# Patient Record
Sex: MALE | Race: WHITE | URBAN - METROPOLITAN AREA
[De-identification: names, ages, dates, MRNs, and addresses within clinical notes are randomized per-mention and may not be internally consistent; named-entity substitution may affect disease eponyms.]

---

## 2021-04-13 DIAGNOSIS — Z23 ENCOUNTER FOR IMMUNIZATION: ICD-10-CM

## 2023-12-07 ENCOUNTER — TELEPHONE (OUTPATIENT)
Dept: NEUROLOGY | Facility: CLINIC | Age: 78
End: 2023-12-07

## 2023-12-07 NOTE — TELEPHONE ENCOUNTER
12/7 at 10:10 am:    This is Mine Lopez, and I just missed a call from Soy Baldwin. He left me a voice mail to call about a triage. So you can call me back. Pt was calling from phone #[phone].

## 2024-01-04 ENCOUNTER — TELEPHONE (OUTPATIENT)
Dept: NEUROLOGY | Facility: CLINIC | Age: 79
End: 2024-01-04

## 2024-01-08 ENCOUNTER — OFFICE VISIT (OUTPATIENT)
Dept: NEUROLOGY | Facility: CLINIC | Age: 79
End: 2024-01-08
Payer: MEDICARE

## 2024-01-08 VITALS
HEART RATE: 99 BPM | HEIGHT: 69 IN | OXYGEN SATURATION: 100 % | RESPIRATION RATE: 18 BRPM | SYSTOLIC BLOOD PRESSURE: 130 MMHG | BODY MASS INDEX: 35.7 KG/M2 | DIASTOLIC BLOOD PRESSURE: 79 MMHG | WEIGHT: 241 LBS | TEMPERATURE: 97.2 F

## 2024-01-08 DIAGNOSIS — R79.9 ABNORMAL FINDING OF BLOOD CHEMISTRY, UNSPECIFIED: ICD-10-CM

## 2024-01-08 DIAGNOSIS — R27.9 UNSPECIFIED LACK OF COORDINATION: ICD-10-CM

## 2024-01-08 DIAGNOSIS — G62.9 NEUROPATHY: Primary | ICD-10-CM

## 2024-01-08 PROCEDURE — 99204 OFFICE O/P NEW MOD 45 MIN: CPT | Performed by: PSYCHIATRY & NEUROLOGY

## 2024-01-08 RX ORDER — GABAPENTIN 600 MG/1
TABLET ORAL
COMMUNITY
Start: 2023-12-18

## 2024-01-08 RX ORDER — IBUPROFEN 400 MG/1
400 TABLET ORAL
COMMUNITY

## 2024-01-08 RX ORDER — ZOLPIDEM TARTRATE 5 MG/1
5 TABLET ORAL
COMMUNITY

## 2024-01-08 RX ORDER — SIMVASTATIN 20 MG
20 TABLET ORAL DAILY
COMMUNITY

## 2024-01-08 RX ORDER — PANTOPRAZOLE SODIUM 20 MG/1
TABLET, DELAYED RELEASE ORAL
COMMUNITY
Start: 2023-10-26

## 2024-01-08 RX ORDER — TAMSULOSIN HYDROCHLORIDE 0.4 MG/1
CAPSULE ORAL
COMMUNITY
Start: 2023-12-04

## 2024-01-08 RX ORDER — LOSARTAN POTASSIUM AND HYDROCHLOROTHIAZIDE 12.5; 5 MG/1; MG/1
1 TABLET ORAL DAILY
COMMUNITY

## 2024-01-08 RX ORDER — DULOXETIN HYDROCHLORIDE 60 MG/1
CAPSULE, DELAYED RELEASE ORAL
COMMUNITY
Start: 2023-12-18

## 2024-01-08 NOTE — ASSESSMENT & PLAN NOTE
Mr. Reid has gradually progressive stocking distribution numbness.  He has prominent vibratory loss but the remainder of his examination is relatively benign from a neurologic standpoint.  The significance of his possible sarcoidosis is not certain to me at this time, and I told him he should follow-up with his regular doctor.  It is hard for me to believe that it has reactivated after 50 years but I will defer to his regular doctor.  I suspect he has neuropathy; I doubt that his neck and back have because his entire clinical picture.  His neck and back have both been surgically decompressed.  I do not think that further imaging is required at this time.  I am going to obtain an EMG of the legs.  The most interesting variable from my standpoint is his severe truncal pain with rapid weight loss that occurred in 2018.  Shingles was a consideration which is certainly possible, but I am not sure that I can explain his weight loss easily.  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  I am going to obtain further laboratory testing.  It is possible that he has multiple diagnoses.  Alarming gastrointestinal pathology was apparently excluded during his hospitalization and does not need to be repeated at this time.  Further measures will be considered based on his test results and clinical course.

## 2024-01-08 NOTE — PROGRESS NOTES
"Please note: this note was created with voice recognition software. Occasional wrong word or \"sound alike\" substitutions may occur due to the inherent limitations of voice recognition software. Read the chart carefully and recognize (using context) where substitutions may have occurred. I am available to discuss any questions.       1. Neuropathy  Assessment & Plan:  Mr. Reid has gradually progressive stocking distribution numbness.  He has prominent vibratory loss but the remainder of his examination is relatively benign from a neurologic standpoint.  The significance of his possible sarcoidosis is not certain to me at this time, and I told him he should follow-up with his regular doctor.  It is hard for me to believe that it has reactivated after 50 years but I will defer to his regular doctor.  I suspect he has neuropathy; I doubt that his neck and back have because his entire clinical picture.  His neck and back have both been surgically decompressed.  I do not think that further imaging is required at this time.  I am going to obtain an EMG of the legs.  The most interesting variable from my standpoint is his severe truncal pain with rapid weight loss that occurred in 2018.  Shingles was a consideration which is certainly possible, but I am not sure that I can explain his weight loss easily.  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  I am going to obtain further laboratory testing.  It is possible that he has multiple diagnoses.  Alarming gastrointestinal pathology was apparently excluded during his hospitalization and does not need to be repeated at this time.  Further measures will be considered based on his test results and clinical course.    Orders:  -     HEMOGLOBIN A1C W/ EAG ESTIMATION; Future  -     Protein, Total and Protein Electrophoresis with Immunofixation; Future  -     EMG 2 limb lower " extremity; Future  -     Vitamin B12; Future  -     Methylmalonic acid, serum; Future    2. Abnormal finding of blood chemistry, unspecified  -     HEMOGLOBIN A1C W/ EAG ESTIMATION; Future    3. Unspecified lack of coordination  -     Vitamin B12; Future        Problem List Items Addressed This Visit       Neuropathy - Primary     Mr. Reid has gradually progressive stocking distribution numbness.  He has prominent vibratory loss but the remainder of his examination is relatively benign from a neurologic standpoint.  The significance of his possible sarcoidosis is not certain to me at this time, and I told him he should follow-up with his regular doctor.  It is hard for me to believe that it has reactivated after 50 years but I will defer to his regular doctor.  I suspect he has neuropathy; I doubt that his neck and back have because his entire clinical picture.  His neck and back have both been surgically decompressed.  I do not think that further imaging is required at this time.  I am going to obtain an EMG of the legs.  The most interesting variable from my standpoint is his severe truncal pain with rapid weight loss that occurred in 2018.  Shingles was a consideration which is certainly possible, but I am not sure that I can explain his weight loss easily.  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  I am going to obtain further laboratory testing.  It is possible that he has multiple diagnoses.  Alarming gastrointestinal pathology was apparently excluded during his hospitalization and does not need to be repeated at this time.  Further measures will be considered based on his test results and clinical course.         Relevant Orders    HEMOGLOBIN A1C W/ EAG ESTIMATION    Protein, Total and Protein Electrophoresis with Immunofixation    EMG 2 limb lower extremity    Vitamin B12    Methylmalonic acid, serum      Other Visit Diagnoses       Abnormal finding of blood chemistry, unspecified        Relevant Orders    HEMOGLOBIN A1C W/ EAG ESTIMATION    Unspecified lack of coordination        Relevant Orders    Vitamin B12            Problem List       Neuropathy    Current Assessment & Plan     Mr. Reid has gradually progressive stocking distribution numbness.  He has prominent vibratory loss but the remainder of his examination is relatively benign from a neurologic standpoint.  The significance of his possible sarcoidosis is not certain to me at this time, and I told him he should follow-up with his regular doctor.  It is hard for me to believe that it has reactivated after 50 years but I will defer to his regular doctor.  I suspect he has neuropathy; I doubt that his neck and back have because his entire clinical picture.  His neck and back have both been surgically decompressed.  I do not think that further imaging is required at this time.  I am going to obtain an EMG of the legs.  The most interesting variable from my standpoint is his severe truncal pain with rapid weight loss that occurred in 2018.  Shingles was a consideration which is certainly possible, but I am not sure that I can explain his weight loss easily.  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  I am going to obtain further laboratory testing.  It is possible that he has multiple diagnoses.  Alarming gastrointestinal pathology was apparently excluded during his hospitalization and does not need to be repeated at this time.  Further measures will be considered based on his test results and clinical course.          Other Visit Diagnoses       Abnormal finding of blood chemistry, unspecified        Unspecified lack of coordination                  I had the pleasure of seeing Kevin Reid, a 78 y.o. male, for neuromuscular consultation. The  "referral was for neuropathy.     In 2018 he had a right knee replacement.  1 or 2 months after he developed severe \"unbearable\" pain \"in my rib cage traveling around to the back\" on the left.  He could not sleep and lost 20 pounds in approximately 3 weeks.  He was admitted to the hospital for chest pain.  He had \"a patch on my back and it was pale around to the front\" on his skin so the diagnosis of shingles was considered, and he was treated with antiviral medications.  Laboratory testing for Lyme disease was \"marginally positive\" and he was treated with antibiotics.  An infectious disease specialist eventually told him he had \"Lyme disease at some point\".  After a few months the pain resolved.  In retrospect, even before the surgery he had been experiencing \"numbness\" in the right foot but with further questioning it was \"crampy, did not feel right\" without sensory loss.  However, after the pain resolved he noticed that he could not feel his feet bilaterally to touch, mostly in the toes and the heels.  It has gradually felt \"like I have socks on even though I do not\" and it is uncomfortable for him to wear pants.  This has gradually climbed to the knees, sometimes higher.  Sometimes he perceives diminished sensation in his fingertips.     He had a series of MRI studies of the entire spine.  He apparently had moderate to severe lumbar stenosis, and he had decompressive surgery in 2018.  He had a cervical laminectomy in 2019 as well, but these have not led to a change in his neurologic symptoms.    He was found to have enlarged lymph nodes in his mediastinum leading to mediastinoscopy and the diagnosis of sarcoidosis in 1973.  He was treated with prednisone, and this has not returned.  He has \"borderline fasting sugars\" but his hemoglobin A1c has ranged from 5.1-5.6.      Past Medical History:   Diagnosis Date    Hypertension        Past Surgical History:   Procedure Laterality Date    REPLACEMENT TOTAL KNEE Right  "    REPLACEMENT TOTAL KNEE Left     TOTAL HIP ARTHROPLASTY Left        Patient has no known allergies.    Social History     Tobacco Use   Smoking Status Never   Smokeless Tobacco Never       Social History     Substance and Sexual Activity   Alcohol Use None       Social History     Substance and Sexual Activity   Drug Use Not on file       History reviewed. No pertinent family history.      Current Outpatient Medications:     DULoxetine (CYMBALTA) 60 mg delayed release capsule, , Disp: , Rfl:     gabapentin (NEURONTIN) 600 MG tablet, , Disp: , Rfl:     ibuprofen (MOTRIN) 400 mg tablet, Take 400 mg by mouth, Disp: , Rfl:     losartan-hydrochlorothiazide (HYZAAR) 50-12.5 mg per tablet, Take 1 tablet by mouth daily, Disp: , Rfl:     pantoprazole (PROTONIX) 20 mg tablet, , Disp: , Rfl:     simvastatin (ZOCOR) 20 mg tablet, Take 20 mg by mouth daily, Disp: , Rfl:     tamsulosin (FLOMAX) 0.4 mg, , Disp: , Rfl:     zolpidem (AMBIEN) 5 mg tablet, Take 5 mg by mouth, Disp: , Rfl:     No visits with results within 6 Month(s) from this visit.   Latest known visit with results is:   No results found for any previous visit.        No results found for this or any previous visit.     No results found for this or any previous visit.    No results found for this or any previous visit.    No results found for this or any previous visit.    No results found for this or any previous visit.    No results found for this or any previous visit.    No results found for this or any previous visit.    No results found for this or any previous visit.    No results found for this or any previous visit.    No results found for this or any previous visit.    No results found for this or any previous visit.    No results found for this or any previous visit.      Review of Systems   Constitutional:  Negative for chills and fever.   HENT:  Negative for ear pain and sore throat.    Eyes:  Negative for pain and visual disturbance.   Respiratory:   "Negative for cough and shortness of breath.    Cardiovascular:  Negative for chest pain and palpitations.   Gastrointestinal:  Negative for abdominal pain and vomiting.   Genitourinary:  Negative for dysuria and hematuria.   Musculoskeletal:  Negative for arthralgias and back pain.   Skin:  Negative for color change and rash.   Neurological:  Positive for numbness (both feet and legs and finger tips). Negative for seizures and syncope.   All other systems reviewed and are negative.        On examination,     Blood pressure 130/79, pulse 99, temperature (!) 97.2 °F (36.2 °C), temperature source Temporal, resp. rate 18, height 5' 9\" (1.753 m), weight 109 kg (241 lb), SpO2 100%.    Well developed, well nourished, in no acute distress    Normocephalic, atraumatic    Heart: regular rate and rhythm    No scarring seen over trunk, anteriorly or posteriorly    Extremities: no clubbing, cyanosis, or edema    Speech and cognition appeared normal    Cranial nerves:  II: Pupils equal, round, and reactive to light. No gross visual field defect. I did not appreciate optic disc edema.  III, IV, VI: Extraocular movements intact  V: Normal facial sensation in all three divisions of the trigeminal nerve bilaterally  VII: Normal facial strength  VIII: Hearing intact to finger rub bilaterally  IX, X: Palate elevated symmetrically  XI: Sternocleidomastoid strength normal bilaterally  XII: Tongue protruded in midline without atrophy or fibrillations    Motor:  Normal tone and bulk throughout.   Muscle strength testing by the [facility] scale was 5/5 in the deltoid, biceps, triceps, wrist extensors, wrist flexors, finger extensors, finger flexors,. Hip flexors, quadriceps, ankle dorsiflexors, ankle plantar flexors, and EHL bilaterally    Deep tendon reflexes:   2+ and symmetrical in the biceps, triceps, brachioradialis, and patellas; 2+ in the left ankle, 1+ on the right  Toes downgoing (no Babinski sign)  No Menendez's sign    Sensation: Normal " pinprick and light touch throughout. No truncal sensory level present.  Markedly diminished vibratory sensation in the legs    Cerebellar: normal finger to nose and heel to shin testing    Gait: Normal heel, toe, and tandem gait            There are no Patient Instructions on file for this visit.      Thank you very much for allowing me to participate in your patient's care. Please feel free to contact me for any questions or concerns. Please be aware of the inherent limitations of voice recognition software, which may result in transcriptional errors.    Quirino Ott MD

## 2024-01-11 ENCOUNTER — APPOINTMENT (OUTPATIENT)
Dept: LAB | Facility: CLINIC | Age: 79
End: 2024-01-11
Payer: MEDICARE

## 2024-01-11 DIAGNOSIS — R79.9 ABNORMAL FINDING OF BLOOD CHEMISTRY, UNSPECIFIED: ICD-10-CM

## 2024-01-11 DIAGNOSIS — G62.9 NEUROPATHY: ICD-10-CM

## 2024-01-11 DIAGNOSIS — R27.9 UNSPECIFIED LACK OF COORDINATION: ICD-10-CM

## 2024-01-11 LAB
EST. AVERAGE GLUCOSE BLD GHB EST-MCNC: 131 MG/DL
HBA1C MFR BLD: 6.2 %
VIT B12 SERPL-MCNC: 640 PG/ML (ref 180–914)

## 2024-01-11 PROCEDURE — 83036 HEMOGLOBIN GLYCOSYLATED A1C: CPT

## 2024-01-11 PROCEDURE — 82607 VITAMIN B-12: CPT

## 2024-01-11 PROCEDURE — 83918 ORGANIC ACIDS TOTAL QUANT: CPT

## 2024-01-11 PROCEDURE — 36415 COLL VENOUS BLD VENIPUNCTURE: CPT

## 2024-01-11 PROCEDURE — 84165 PROTEIN E-PHORESIS SERUM: CPT

## 2024-01-13 LAB
ALBUMIN SERPL ELPH-MCNC: 3.7 G/DL (ref 3.2–5.1)
ALBUMIN SERPL ELPH-MCNC: 58.7 % (ref 48–70)
ALPHA1 GLOB SERPL ELPH-MCNC: 0.28 G/DL (ref 0.15–0.47)
ALPHA1 GLOB SERPL ELPH-MCNC: 4.5 % (ref 1.8–7)
ALPHA2 GLOB SERPL ELPH-MCNC: 0.74 G/DL (ref 0.42–1.04)
ALPHA2 GLOB SERPL ELPH-MCNC: 11.7 % (ref 5.9–14.9)
BETA GLOB ABNORMAL SERPL ELPH-MCNC: 0.4 G/DL (ref 0.31–0.57)
BETA1 GLOB SERPL ELPH-MCNC: 6.4 % (ref 4.7–7.7)
BETA2 GLOB SERPL ELPH-MCNC: 6.9 % (ref 3.1–7.9)
BETA2+GAMMA GLOB SERPL ELPH-MCNC: 0.43 G/DL (ref 0.2–0.58)
GAMMA GLOB ABNORMAL SERPL ELPH-MCNC: 0.74 G/DL (ref 0.4–1.66)
GAMMA GLOB SERPL ELPH-MCNC: 11.8 % (ref 6.9–22.3)
IGG/ALB SER: 1.42 {RATIO} (ref 1.1–1.8)
PROT PATTERN SERPL ELPH-IMP: ABNORMAL
PROT SERPL-MCNC: 6.3 G/DL (ref 6.4–8.2)

## 2024-01-13 PROCEDURE — 84165 PROTEIN E-PHORESIS SERUM: CPT | Performed by: STUDENT IN AN ORGANIZED HEALTH CARE EDUCATION/TRAINING PROGRAM

## 2024-01-18 LAB — METHYLMALONATE SERPL-SCNC: 135 NMOL/L (ref 0–378)

## 2024-01-25 ENCOUNTER — HOSPITAL ENCOUNTER (OUTPATIENT)
Dept: NEUROLOGY | Facility: CLINIC | Age: 79
End: 2024-01-25
Payer: MEDICARE

## 2024-01-25 DIAGNOSIS — G62.9 NEUROPATHY: ICD-10-CM

## 2024-01-25 PROBLEM — M54.17 LUMBOSACRAL RADICULOPATHY: Status: ACTIVE | Noted: 2024-01-25

## 2024-01-25 PROCEDURE — 95886 MUSC TEST DONE W/N TEST COMP: CPT | Performed by: PSYCHIATRY & NEUROLOGY

## 2024-01-25 PROCEDURE — 95911 NRV CNDJ TEST 9-10 STUDIES: CPT | Performed by: PSYCHIATRY & NEUROLOGY

## 2024-02-09 ENCOUNTER — TELEPHONE (OUTPATIENT)
Dept: NEUROLOGY | Facility: CLINIC | Age: 79
End: 2024-02-09

## 2024-02-09 NOTE — TELEPHONE ENCOUNTER
Spoke to pt-advised Dr Ott would liek to have a follow up appt with him-scheduled for 2/19 @ 10:30

## 2024-02-09 NOTE — TELEPHONE ENCOUNTER
I am a little surprised because the study really did not seem all that abnormal. I would like to take another looka t him- schedule for 30 minute follow-up

## 2024-02-09 NOTE — TELEPHONE ENCOUNTER
Patient called he did the EMG. He would like Dr Ott to give him a call to go over the results. Please call patient at [phone].

## 2024-02-15 ENCOUNTER — TELEPHONE (OUTPATIENT)
Dept: NEUROLOGY | Facility: CLINIC | Age: 79
End: 2024-02-15

## 2024-02-19 ENCOUNTER — OFFICE VISIT (OUTPATIENT)
Dept: NEUROLOGY | Facility: CLINIC | Age: 79
End: 2024-02-19
Payer: MEDICARE

## 2024-02-19 VITALS
DIASTOLIC BLOOD PRESSURE: 76 MMHG | WEIGHT: 238.4 LBS | TEMPERATURE: 98.2 F | BODY MASS INDEX: 35.21 KG/M2 | HEART RATE: 70 BPM | SYSTOLIC BLOOD PRESSURE: 128 MMHG | OXYGEN SATURATION: 97 %

## 2024-02-19 DIAGNOSIS — M54.50 LOW BACK PAIN: Primary | ICD-10-CM

## 2024-02-19 DIAGNOSIS — G62.9 NEUROPATHY: ICD-10-CM

## 2024-02-19 PROCEDURE — 99213 OFFICE O/P EST LOW 20 MIN: CPT | Performed by: PSYCHIATRY & NEUROLOGY

## 2024-02-19 NOTE — ASSESSMENT & PLAN NOTE
I suspect that Mr. Reid has neuropathy even though it was not confirmed on recent electrodiagnostic testing.  His electrical study did show features of lumbar radiculopathy but I am not convinced this accounts for his entire clinical picture.  Laboratory testing has been significant only for a glycosylated hemoglobin level of 6.2.  This is particularly interesting given that he had symptoms that might suggest diabetic thoracoabdominal neuropathy even though they were years ago.  This seems to predate any obvious potential glucose intolerance but I do not see an obvious alternative diagnosis at this time.  Regardless, I believe that the most important treatment right now is going to be continued monitoring and treatment of any glucose intolerance.

## 2024-02-19 NOTE — PROGRESS NOTES
"Please note: this note was created with voice recognition software. Occasional wrong word or \"sound alike\" substitutions may occur due to the inherent limitations of voice recognition software. Read the chart carefully and recognize (using context) where substitutions may have occurred. I am available to discuss any questions.       1. Low back pain  -     Ambulatory Referral to Physical Therapy; Future    2. Neuropathy  Assessment & Plan:  I suspect that Mr. Reid has neuropathy even though it was not confirmed on recent electrodiagnostic testing.  His electrical study did show features of lumbar radiculopathy but I am not convinced this accounts for his entire clinical picture.  Laboratory testing has been significant only for a glycosylated hemoglobin level of 6.2.  This is particularly interesting given that he had symptoms that might suggest diabetic thoracoabdominal neuropathy even though they were years ago.  This seems to predate any obvious potential glucose intolerance but I do not see an obvious alternative diagnosis at this time.  Regardless, I believe that the most important treatment right now is going to be continued monitoring and treatment of any glucose intolerance.          Problem List Items Addressed This Visit       Neuropathy     I suspect that Mr. Reid has neuropathy even though it was not confirmed on recent electrodiagnostic testing.  His electrical study did show features of lumbar radiculopathy but I am not convinced this accounts for his entire clinical picture.  Laboratory testing has been significant only for a glycosylated hemoglobin level of 6.2.  This is particularly interesting given that he had symptoms that might suggest diabetic thoracoabdominal neuropathy even though they were years ago.  This seems to predate any obvious potential glucose intolerance but I do not see an obvious alternative diagnosis at this time.  Regardless, I believe that the most important treatment " "right now is going to be continued monitoring and treatment of any glucose intolerance.          Other Visit Diagnoses       Low back pain    -  Primary    Relevant Orders    Ambulatory Referral to Physical Therapy            Problem List       Neuropathy    Current Assessment & Plan     I suspect that Mr. Reid has neuropathy even though it was not confirmed on recent electrodiagnostic testing.  His electrical study did show features of lumbar radiculopathy but I am not convinced this accounts for his entire clinical picture.  Laboratory testing has been significant only for a glycosylated hemoglobin level of 6.2.  This is particularly interesting given that he had symptoms that might suggest diabetic thoracoabdominal neuropathy even though they were years ago.  This seems to predate any obvious potential glucose intolerance but I do not see an obvious alternative diagnosis at this time.  Regardless, I believe that the most important treatment right now is going to be continued monitoring and treatment of any glucose intolerance.         Lumbosacral radiculopathy     Other Visit Diagnoses       Low back pain    -  Primary              I had the pleasure of seeing Kevin Reid, a 78 y.o. male, for neuromuscular follow-up.     In 2018 he had right knee replacement.  Within 2 months he developed severe left \"unbearable\" pain \"in my rib cage traveling around to the back\".  He lost 20 pounds in 3 weeks and could not sleep.   He had \"a patch on my back and it was pale around to the front\" on his skin; the diagnosis of shingles was considered, and he was treated with antiviral medications.  Laboratory testing for Lyme disease was \"marginally positive\" and he was treated with antibiotics.  An ID specialist told him he had \"Lyme disease at some point\".  After a few months the pain resolved.  In retrospect, even before the surgery he had been experiencing \"numbness\" in the right foot but with further questioning it was " "\"crampy, did not feel right\" without sensory loss.  After the pain resolved he noticed that he could not feel his feet bilaterally to touch, mostly in the toes and the heels.  It now feels \"like I have socks on even though I do not\" .  This has gradually climbed to the knees, sometimes higher.  Sometimes he perceives diminished sensation in his fingertips.      He had a series of MRI studies of the entire spine.  He apparently had moderate to severe lumbar stenosis, and he had decompressive surgery in 2018.  He had a cervical laminectomy in 2019 as well, but these have not led to a change in his neurologic symptoms.     He was found to have enlarged lymph nodes in his mediastinum leading to mediastinoscopy and the diagnosis of sarcoidosis in 1973.  He was treated with prednisone, and this has not returned.  He has \"borderline fasting sugars\" but his hemoglobin A1c has ranged from 5.1-5.6, but is now 6.2.  Laboratory testing was otherwise unremarkable.  He had an EMG which was in keeping with a chronic left L5-S1 radiculopathy and right L4-S1 radiculopathy but without underlying evidence of neuropathy.           Past Medical History:   Diagnosis Date    Hypertension        Past Surgical History:   Procedure Laterality Date    REPLACEMENT TOTAL KNEE Right     REPLACEMENT TOTAL KNEE Left     TOTAL HIP ARTHROPLASTY Left        Patient has no known allergies.    Social History     Tobacco Use   Smoking Status Never   Smokeless Tobacco Never       Social History     Substance and Sexual Activity   Alcohol Use None       Social History     Substance and Sexual Activity   Drug Use Not on file       History reviewed. No pertinent family history.      Current Outpatient Medications:     DULoxetine (CYMBALTA) 60 mg delayed release capsule, , Disp: , Rfl:     gabapentin (NEURONTIN) 600 MG tablet, , Disp: , Rfl:     ibuprofen (MOTRIN) 400 mg tablet, Take 400 mg by mouth, Disp: , Rfl:     losartan-hydrochlorothiazide (HYZAAR) " 50-12.5 mg per tablet, Take 1 tablet by mouth daily, Disp: , Rfl:     pantoprazole (PROTONIX) 20 mg tablet, , Disp: , Rfl:     simvastatin (ZOCOR) 20 mg tablet, Take 20 mg by mouth daily, Disp: , Rfl:     tamsulosin (FLOMAX) 0.4 mg, , Disp: , Rfl:     zolpidem (AMBIEN) 5 mg tablet, Take 5 mg by mouth, Disp: , Rfl:     Appointment on 01/11/2024   Component Date Value Ref Range Status    Hemoglobin A1C 01/11/2024 6.2 (H)  Normal 4.0-5.6%; PreDiabetic 5.7-6.4%; Diabetic >=6.5%; Glycemic control for adults with diabetes <7.0% % Final    EAG 01/11/2024 131  mg/dl Final    Vitamin B-12 01/11/2024 640  180 - 914 pg/mL Final    Methylmalonic Acid, S 01/11/2024 135  0 - 378 nmol/L Final    A/G Ratio 01/11/2024 1.42  1.10 - 1.80 Final    Albumin Electrophoresis 01/11/2024 58.7  48.0 - 70.0 % Final    Albumin CONC 01/11/2024 3.70  3.20 - 5.10 g/dl Final    Alpha 1 01/11/2024 4.5  1.8 - 7.0 % Final    ALPHA 1 CONC 01/11/2024 0.28  0.15 - 0.47 g/dL Final    Alpha 2 01/11/2024 11.7  5.9 - 14.9 % Final    ALPHA 2 CONC 01/11/2024 0.74  0.42 - 1.04 g/dL Final    Beta-1 01/11/2024 6.4  4.7 - 7.7 % Final    BETA 1 CONC 01/11/2024 0.40  0.31 - 0.57 g/dL Final    Beta-2 01/11/2024 6.9  3.1 - 7.9 % Final    BETA 2 CONC 01/11/2024 0.43  0.20 - 0.58 g/dL Final    Gamma Globulin 01/11/2024 11.8  6.9 - 22.3 % Final    GAMMA CONC 01/11/2024 0.74  0.40 - 1.66 g/dL Final    Total Protein 01/11/2024 6.3 (L)  6.4 - 8.2 g/dL Final    SPEP Interpretation 01/11/2024 See Comment   Final    No monoclonal bands noted. Reviewed by: Naren Nj MD **Electronic Signature**        No results found for this or any previous visit.     No results found for this or any previous visit.    No results found for this or any previous visit.    No results found for this or any previous visit.    No results found for this or any previous visit.    No results found for this or any previous visit.    No results found for this or any previous visit.    No  results found for this or any previous visit.    No results found for this or any previous visit.    No results found for this or any previous visit.    No results found for this or any previous visit.    No results found for this or any previous visit.      Review of Systems   Constitutional:  Negative for appetite change, fatigue and fever.   HENT: Negative.  Negative for hearing loss, tinnitus, trouble swallowing and voice change.    Eyes: Negative.  Negative for photophobia, pain and visual disturbance.   Respiratory: Negative.  Negative for shortness of breath.    Cardiovascular: Negative.  Negative for palpitations.   Gastrointestinal: Negative.  Negative for nausea and vomiting.   Endocrine: Negative.  Negative for cold intolerance.   Genitourinary: Negative.  Negative for dysuria, frequency and urgency.   Musculoskeletal:  Negative for back pain, gait problem, myalgias, neck pain and neck stiffness.   Skin: Negative.  Negative for rash.   Allergic/Immunologic: Negative.    Neurological:  Positive for numbness (b/l legs symptoms unchanged). Negative for dizziness, tremors, seizures, syncope, facial asymmetry, speech difficulty, weakness, light-headedness and headaches.   Hematological: Negative.  Does not bruise/bleed easily.   Psychiatric/Behavioral:  Positive for sleep disturbance (recently diagnosed w/sleep apnea). Negative for confusion and hallucinations.          On examination,     Blood pressure 128/76, pulse 70, temperature 98.2 °F (36.8 °C), temperature source Temporal, weight 108 kg (238 lb 6.4 oz), SpO2 97%.    Well developed, well nourished, in no acute distress    Normocephalic, atraumatic      Speech and cognition appeared normal    Cranial nerves:  II: Pupils equal, round, and reactive to light. No gross visual field defect. I did not appreciate optic disc edema.  III, IV, VI: Extraocular movements intact  V: Normal facial sensation in all three divisions of the trigeminal nerve  bilaterally  VII: Normal facial strength  VIII: Hearing intact to finger rub bilaterally  IX, X: Palate elevated symmetrically  XI: Sternocleidomastoid strength normal bilaterally  XII: Tongue protruded in midline without atrophy or fibrillations    Motor:  Normal tone and bulk throughout.   Muscle strength testing by the MRC scale was 5/5 in the deltoid, biceps, triceps, wrist extensors, wrist flexors, finger extensors, finger flexors,. Hip flexors, quadriceps, ankle dorsiflexors, ankle plantar flexors, and EHL bilaterally    Deep tendon reflexes:   2+ and symmetrical in the biceps, triceps, brachioradialis, and patellas; 2+ in the left ankle, 1+ on the right  Toes downgoing (no Babinski sign)  No Menendez's sign     Sensation: Normal pinprick and light touch throughout. No truncal sensory level present.  Markedly diminished vibratory sensation in the legs      Cerebellar: normal finger to nose and heel to shin testing    Gait: Normal heel, toe, and tandem gait            There are no Patient Instructions on file for this visit.      Thank you very much for allowing me to participate in your patient's care. Please feel free to contact me for any questions or concerns. Please be aware of the inherent limitations of voice recognition software, which may result in transcriptional errors.    Quirino Ott MD

## 2024-02-29 ENCOUNTER — EVALUATION (OUTPATIENT)
Dept: PHYSICAL THERAPY | Facility: CLINIC | Age: 79
End: 2024-02-29
Payer: MEDICARE

## 2024-02-29 DIAGNOSIS — I10 HYPERTENSION, UNSPECIFIED TYPE: ICD-10-CM

## 2024-02-29 DIAGNOSIS — G62.9 NEUROPATHY: ICD-10-CM

## 2024-02-29 DIAGNOSIS — M54.50 CHRONIC BILATERAL LOW BACK PAIN WITHOUT SCIATICA: Primary | ICD-10-CM

## 2024-02-29 DIAGNOSIS — G89.29 CHRONIC BILATERAL LOW BACK PAIN WITHOUT SCIATICA: Primary | ICD-10-CM

## 2024-02-29 PROCEDURE — 97110 THERAPEUTIC EXERCISES: CPT

## 2024-02-29 PROCEDURE — 97161 PT EVAL LOW COMPLEX 20 MIN: CPT

## 2024-02-29 NOTE — PROGRESS NOTES
PT Evaluation     Today's date: 2024  Patient name: Kevin Reid  : 1945  MRN: [id number]  Referring provider: Quirino Ott MD  Dx:   Encounter Diagnosis     ICD-10-CM    1. Chronic bilateral low back pain without sciatica  M54.50 Ambulatory Referral to Physical Therapy    G89.29       2. Hypertension, unspecified type  I10       3. Neuropathy  G62.9           Start Time: 848  Stop Time: 931  Total time in clinic (min): 43 minutes    Assessment  Assessment details: The pt is a 78 year old male who presents to skilled physical therapy services due to a decline in functional status related to an onset of acute on chronic LBP associated with lifting/carrying [employer] tree 4-6 weeks ago. Upon completion of the IE, the pt presents with impairments in pain, thoracolumbar mobility, flexibility, core/abdominal strength (diastasis recti - bulge), neural sensation (PMH of neuropathy), and LE strength. As a result of these impairments, the pt has difficulty with the following functional activities: standing, lifting, carrying, transfers (STS, bed mobility), ambulating, self-care/hygiene, stair negotiations, as well as participating in ADLs/IALDs and recreational activities (e.g., golf). POC will include manual therapy for flexibility/mobility and symptom modulation, modalities (PRN), TE/TA/NR interventions for functional strength and neuromuscular control, HEP, and pt education. The pt will continue to benefit from skilled physical therapy services to address impairments in order to return to his PLOF without limitations due to pain.  Impairments: abnormal or restricted ROM, activity intolerance, impaired physical strength, lacks appropriate home exercise program, pain with function, poor posture  and poor body mechanics    Symptom irritability: moderateBarriers to therapy: Neuropathy; HTN; Lymes Disease; PMH includes LLE Total Hip Replacement, RLE/LLE total knee replacements --- 2019: Laminectomy C3;  Laminectomy L3 and Fusion L4-L5  Understanding of Dx/Px/POC: good   Prognosis: good    Goals  STGs: Achieve within 5 weeks  1. Increase functional LE strength to at least 4+/5 for each major muscle group in order to reflect improved functional strength for ADLs/IADLs.  2. Decrease pain to no greater than 4/10 on the NPRS in order to reflect improved activity capacity/tolerance.  3. Independent with HEP to aid in continued progress between treatment sessions.   4. Increase FOTO Functional Status measure by at least 5 points in order to reflect improvements in functional status and activity capacity.    LTGs: Achieve within 10 weeks  1. Decrease pain to no greater than 2/10 on the NPRS in order to reflect improved activity capacity/tolerance.  2. Progress to at least 90% return to PLOF per pt report in order to reflect improvements in functional status and activity capacity.  3. Increase FOTO Functional Status measure score to at least benchmark score.  4. Independent with HEP for long-term management of symptoms and functional mobility.      Plan  Patient would benefit from: skilled physical therapy  Referral necessary: Yes  Planned modality interventions: thermotherapy: hydrocollator packs  Planned therapy interventions: abdominal trunk stabilization, joint mobilization, manual therapy, balance/weight bearing training, body mechanics training, neuromuscular re-education, patient education, postural training, flexibility, strengthening, stretching, functional ROM exercises, therapeutic activities, therapeutic exercise and home exercise program  Frequency: 1x week  Duration in weeks: 10  Plan of Care beginning date: 2/29/2024  Plan of Care expiration date: 5/3/2024  Treatment plan discussed with: patient        Subjective Evaluation    History of Present Illness  Date of onset: 1/15/2024  Mechanism of injury: Pt reports symptoms of chronic LBP with a recent increase in pain over the past 4-6 weeks after  "carrying/lifting [employer] tree out of the house. -- Pt reports a history of LBP (R side > L side).      Chronic LE knee pain (L > R) pain, particularly with going up stairs. Pt denies that the knee give out and notes that his knee feel stable.    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 (no medical plans); Multiple steroid injections for low back; Ablation ( - \"that did not do anything at all\")          Recurrent probem    Patient Goals  Patient goals for therapy: decreased pain, increased motion, independence with ADLs/IADLs and return to sport/leisure activities  Patient goal: Golf (currently can tolerate 9 holes)  Pain  Current pain ratin  At best pain ratin  At worst pain ratin  Quality: dull ache  Relieving factors: relaxation, rest and change in position (Cat-cow)  Aggravating factors: standing, walking, stair climbing and lifting (Carrying; Lifting/carrying fire wood; Standing (tolerance: 30 minutes at most); Walking (hurts both knees and back); Getting out of bed in morning; Getting out of a chair; Self-care/hygiene)  Progression: worsening    Social Support  Steps to enter house: yes (Front: 1; Back: 13)  Stairs in house: yes (2 sets: 13 steps)   Lives in: multiple-level home  Lives with: spouse    Employment status: not working ()  Treatments  Previous treatment: injection treatment and medication  Current treatment: injection treatment, medication and physical therapy  Current treatment comments: Injection in left shoulder within past year.         Objective     Concurrent Complaints  Negative for night pain, disturbed sleep, bladder dysfunction and bowel dysfunction    Static Posture     Head  Forward.    Shoulders  Depressed and rounded.    Thoracic Spine  Hyperkyphosis.    Lumbar Spine   Flattened and decreased lordosis.     Postural Observations  Seated posture: fair  Standing posture: fair      Tenderness     Lumbar Spine  No tenderness in the spinous process. " "    Neurological Testing     Sensation     Lumbar   Left   Intact: light touch  Diminished: light touch    Right   Intact: light touch    Comments   Left light touch: Diminished along medial lower leg    Active Range of Motion     Lumbar   Flexion:  with pain Restriction level: moderate  Extension: Active lumbar extension: Pain: \"That's the worst\"  with pain  Left lateral flexion: Active left lumbar lateral flexion: pain left side.    with pain Restriction level: minimal  Right lateral flexion: Active right lumbar lateral flexion: Pain right side.  with pain Restriction level: moderate  Left rotation: Active left lumbar rotation: pain left side.  with pain Restriction level: minimal  Right rotation: Active right lumbar rotation: pain right side.  with pain Restriction level: moderate    Joint Play     Hypomobile: L1, L2, L3, L4, L5 and S1     Strength/Myotome Testing     Left Hip   Planes of Motion   Flexion: 4 (Back pain -- assessed seated)    Right Hip   Planes of Motion   Flexion: 4 (Back pain -- Assessed seated)    Left Knee   Flexion: 4+  Extension: 5    Right Knee   Flexion: 4+  Extension: 5    Left Ankle/Foot   Dorsiflexion: 5    Right Ankle/Foot   Dorsiflexion: 5    Tests     Lumbar     Left   Negative slump test.     Right   Negative slump test.     Left Hip   Negative NIKKI and FADIR.     Right Hip   Negative NIKKI and FADIR.              Precautions: Neuropathy; HTN; Lymes Disease; PMH includes LLE Total Hip Replacement, RLE/LLE total knee replacements --- 2019: Laminectomy C3; Laminectomy L3 and Fusion L4-L5       2/29            Manuals             STM/MFR             Lumbar PA                                       Neuro Re-Ed             Sciatic Nerve Glide - Supine 10x ea LE            TA Activation/Core brace             TA + Iso Hip ADD             TA + Bent Knee Fallout                                                    Ther Ex             Bent Knee Fallout 10x ea LE            PPT 10x          "   Open book             Seated Lumbar Flex w/ PB             Mid Row             Shld Ext/Lat Pulldown             Shld Ext AAROM w/ Cane                                                     Anatomy as it relates to pt's symptoms/presentation KS            Address pt's questions PRN KS            HEP KS                         Ther Activity                                       Gait Training                                       Modalities

## 2024-03-07 ENCOUNTER — OFFICE VISIT (OUTPATIENT)
Dept: PHYSICAL THERAPY | Facility: CLINIC | Age: 79
End: 2024-03-07
Payer: MEDICARE

## 2024-03-07 DIAGNOSIS — G62.9 NEUROPATHY: ICD-10-CM

## 2024-03-07 DIAGNOSIS — M54.50 CHRONIC BILATERAL LOW BACK PAIN WITHOUT SCIATICA: Primary | ICD-10-CM

## 2024-03-07 DIAGNOSIS — G89.29 CHRONIC BILATERAL LOW BACK PAIN WITHOUT SCIATICA: Primary | ICD-10-CM

## 2024-03-07 DIAGNOSIS — I10 HYPERTENSION, UNSPECIFIED TYPE: ICD-10-CM

## 2024-03-07 PROCEDURE — 97110 THERAPEUTIC EXERCISES: CPT

## 2024-03-07 PROCEDURE — 97140 MANUAL THERAPY 1/> REGIONS: CPT

## 2024-03-07 NOTE — PROGRESS NOTES
"Daily Note     Today's date: 3/7/2024  Patient name: Kevin Reid  : 1945  MRN: [id number]  Referring provider: Quirino Ott MD  Dx:   Encounter Diagnosis     ICD-10-CM    1. Chronic bilateral low back pain without sciatica  M54.50     G89.29       2. Hypertension, unspecified type  I10       3. Neuropathy  G62.9           Start Time: 0850  Stop Time: 09  Total time in clinic (min): 41 minutes    Subjective: \"The back's just a little achy.\" Pt notes that he has not been moving or doing much this morning. -- Pt reports that he did the home exercises 1 or 2 times each day since his initial appointment.      Objective: See treatment diary below      Assessment: The pt participated in a skilled physical therapy session that focused on manual intervention, therapeutic exercise, and neuromuscular re-education. The program was progressed as this was the first treatment session after the IE. LE bike was introduced as a warm-up to improve mobility and decrease stiffness. Shoulder extension AAROM with cane was included to address impairments in UE mobility with trunk rotations necessary for self care activities (e.g., bathing, daily hygiene). He presented with mild low back pain at end range as he completed RUE shoulder extension with trunk rotation to the right. He tolerated treatment well. The pt would benefit from continued PT to address impairments in order to improve quality of life and return to his PLOF without limitations due to pain.      Plan: Continue per plan of care.      Precautions: Neuropathy; HTN; Lymes Disease; PMH includes LLE Total Hip Replacement, RLE/LLE total knee replacements --- 2019: Laminectomy C3; Laminectomy L3 and Fusion L4-L5       2/29 3/7           Manuals             STM/MFR  KS           Lumbar PA  KS                                     Neuro Re-Ed             Sciatic Nerve Glide - Supine 10x ea LE            TA Activation/Core brace             TA + Iso Hip ADD           " "  TA + Bent Knee Fallout                                                    Ther Ex             Bent Knee Fallout 10x ea LE 10x ea LE           PPT 10x 15x           Open book  10x ea dir           LTR  15x ea dir           Seated Lumbar Flex w/ PB             Mid Row             Shld Ext/Lat Pulldown             Shld Ext AAROM w/ Cane - w/ mild trunk rotation  15x w/ 3\" hold ea UE           LE Bike  L1 5'                                     Anatomy as it relates to pt's symptoms/presentation KS KS           Address pt's questions PRN KS KS           HEP KS KS           Update on pt's status/symptoms  KS                        Ther Activity                                       Gait Training                                       Modalities                                            "

## 2024-03-14 ENCOUNTER — OFFICE VISIT (OUTPATIENT)
Dept: PHYSICAL THERAPY | Facility: CLINIC | Age: 79
End: 2024-03-14
Payer: MEDICARE

## 2024-03-14 DIAGNOSIS — G89.29 CHRONIC BILATERAL LOW BACK PAIN WITHOUT SCIATICA: Primary | ICD-10-CM

## 2024-03-14 DIAGNOSIS — G62.9 NEUROPATHY: ICD-10-CM

## 2024-03-14 DIAGNOSIS — M54.50 CHRONIC BILATERAL LOW BACK PAIN WITHOUT SCIATICA: Primary | ICD-10-CM

## 2024-03-14 DIAGNOSIS — I10 HYPERTENSION, UNSPECIFIED TYPE: ICD-10-CM

## 2024-03-14 PROCEDURE — 97140 MANUAL THERAPY 1/> REGIONS: CPT

## 2024-03-14 PROCEDURE — 97110 THERAPEUTIC EXERCISES: CPT

## 2024-03-14 PROCEDURE — 97112 NEUROMUSCULAR REEDUCATION: CPT

## 2024-03-14 NOTE — PROGRESS NOTES
"Daily Note     Today's date: 3/14/2024  Patient name: Kevin Reid  : 1945  MRN: [id number]  Referring provider: Quirino Ott MD  Dx:   Encounter Diagnosis     ICD-10-CM    1. Chronic bilateral low back pain without sciatica  M54.50     G89.29       2. Hypertension, unspecified type  I10       3. Neuropathy  G62.9           Start Time: 930  Stop Time: 1011  Total time in clinic (min): 41 minutes    Subjective: Pt reports that he is doing \"alright.\" He notes that he typically wakes up with pain in the right side of his lower back. He experiences a decrease in symptoms after he moves around in the morning. -- Regarding exercises at home, the pt reports that they are \"good\" and he notes that he tries to do them once or twice a day.      Objective: See treatment diary below      Assessment: The pt participated in a skilled physical therapy session that focused on manual intervention, therapeutic exercise, and neuromuscular re-education. Neuromuscular re-education interventions with transverse abdominis activation were introduced during today's treatment session to improve neuromuscular control and strength of lumbopelvic and core musculature necessary for lumbar stability. He tolerated treatment well. The pt would benefit from continued PT to address impairments in order to improve quality of life and return to his PLOF without limitations due to pain.       Plan: Continue per plan of care.      Precautions: Neuropathy; HTN; Lymes Disease; PMH includes LLE Total Hip Replacement, RLE/LLE total knee replacements --- 2019: Laminectomy C3; Laminectomy L3 and Fusion L4-L5       2/29 3/7 3/14          Manuals             STM/MFR  KS KS          Lumbar PA  KS KS                                    Neuro Re-Ed             Sciatic Nerve Glide - Supine 10x ea LE            TA Activation/Core brace   2x10 (w/ exhale)          TA + Iso Hip ADD   2x10 (w/ exhale)          TA + Bent Knee Fallout   Green TB 2x10       " "                                          Ther Ex             Bent Knee Fallout 10x ea LE 10x ea LE           PPT 10x 15x 2x10          Open book  10x ea dir 10x ea dir          LTR  15x ea dir 10 ea direction          Seated Lumbar Flex w/ PB             Mid Row             Shld Ext/Lat Pulldown             Shld Ext AAROM w/ Cane - w/ mild trunk rotation  15x w/ 3\" hold ea UE           LE Bike  L1 5' L1 6'                                    Anatomy as it relates to pt's symptoms/presentation KS KS           Address pt's questions PRN KS KS           HEP KS KS           Update on pt's status/symptoms  KS KS                       Ther Activity                                       Gait Training                                       Modalities                                              "

## 2024-03-21 ENCOUNTER — OFFICE VISIT (OUTPATIENT)
Dept: PHYSICAL THERAPY | Facility: CLINIC | Age: 79
End: 2024-03-21
Payer: MEDICARE

## 2024-03-21 DIAGNOSIS — M54.50 CHRONIC BILATERAL LOW BACK PAIN WITHOUT SCIATICA: Primary | ICD-10-CM

## 2024-03-21 DIAGNOSIS — G89.29 CHRONIC BILATERAL LOW BACK PAIN WITHOUT SCIATICA: Primary | ICD-10-CM

## 2024-03-21 DIAGNOSIS — G62.9 NEUROPATHY: ICD-10-CM

## 2024-03-21 DIAGNOSIS — I10 HYPERTENSION, UNSPECIFIED TYPE: ICD-10-CM

## 2024-03-21 PROCEDURE — 97112 NEUROMUSCULAR REEDUCATION: CPT

## 2024-03-21 PROCEDURE — 97110 THERAPEUTIC EXERCISES: CPT

## 2024-03-21 PROCEDURE — 97140 MANUAL THERAPY 1/> REGIONS: CPT

## 2024-03-21 NOTE — PROGRESS NOTES
"Daily Note     Today's date: 3/21/2024  Patient name: Kevin Reid  : 1945  MRN: [id number]  Referring provider: Quirino Ott MD  Dx:   Encounter Diagnosis     ICD-10-CM    1. Chronic bilateral low back pain without sciatica  M54.50     G89.29       2. Hypertension, unspecified type  I10       3. Neuropathy  G62.9                      Subjective:  Patient states \"I feel so so\"      Objective: See treatment diary below      Assessment: Tolerated treatment well. Continued with POC. Patient demonstrated fatigue post treatment, exhibited good technique with therapeutic exercises, and would benefit from continued PT      Plan: Continue per plan of care.      Precautions: Neuropathy; HTN; Lymes Disease; PMH includes LLE Total Hip Replacement, RLE/LLE total knee replacements --- 2019: Laminectomy C3; Laminectomy L3 and Fusion L4-L5       2/29 3/7 3/14 3/21          Manuals             STM/MFR  KS KS RA         Lumbar PA  KS KS RA                                   Neuro Re-Ed             Sciatic Nerve Glide - Supine 10x ea LE            TA Activation/Core brace   2x10 (w/ exhale) 2x10 w/ exhale         TA + Iso Hip ADD   2x10 (w/ exhale) 2x10 5\"         TA + Bent Knee Fallout   Green TB 2x10 Green TB 2x10                                                Ther Ex             Bent Knee Fallout 10x ea LE 10x ea LE           PPT 10x 15x 2x10 2x10          Open book  10x ea dir 10x ea dir          LTR  15x ea dir 10 ea direction 10 ea direction          Seated Lumbar Flex w/ PB             Mid Row             Shld Ext/Lat Pulldown             Shld Ext AAROM w/ Cane - w/ mild trunk rotation  15x w/ 3\" hold ea UE           LE Bike  L1 5' L1 6' L1 6'                                    Anatomy as it relates to pt's symptoms/presentation KS KS           Address pt's questions PRN KS KS           HEP KS KS           Update on pt's status/symptoms  KS KS                       Ther Activity                                  "      Gait Training                                       Modalities

## 2024-03-28 ENCOUNTER — OFFICE VISIT (OUTPATIENT)
Dept: PHYSICAL THERAPY | Facility: CLINIC | Age: 79
End: 2024-03-28
Payer: MEDICARE

## 2024-03-28 DIAGNOSIS — G89.29 CHRONIC BILATERAL LOW BACK PAIN WITHOUT SCIATICA: Primary | ICD-10-CM

## 2024-03-28 DIAGNOSIS — I10 HYPERTENSION, UNSPECIFIED TYPE: ICD-10-CM

## 2024-03-28 DIAGNOSIS — G62.9 NEUROPATHY: ICD-10-CM

## 2024-03-28 DIAGNOSIS — M54.50 CHRONIC BILATERAL LOW BACK PAIN WITHOUT SCIATICA: Primary | ICD-10-CM

## 2024-03-28 PROCEDURE — 97112 NEUROMUSCULAR REEDUCATION: CPT

## 2024-03-28 PROCEDURE — 97140 MANUAL THERAPY 1/> REGIONS: CPT

## 2024-03-28 PROCEDURE — 97110 THERAPEUTIC EXERCISES: CPT

## 2024-03-28 NOTE — PROGRESS NOTES
PT Progress Report    Today's date: 3/28/2024  Patient name: Kevin Reid  : 1945  MRN: [id number]  Referring provider: Quirino Ott MD  Dx:   Encounter Diagnosis     ICD-10-CM    1. Chronic bilateral low back pain without sciatica  M54.50     G89.29       2. Hypertension, unspecified type  I10       3. Neuropathy  G62.9             Start Time: 841  Stop Time: 927  Total time in clinic (min): 46 minutes    Assessment  Assessment details: The pt is a 78 year old male who presents to skilled physical therapy services due to a decline in functional status related to an onset of acute on chronic LBP associated with lifting/carrying [employer] tree in January. At this point in his POC, the pt has received 5 skilled physical therapy sessions. Upon completion of the SD, the pt presents with improvements in symptom frequency, thoracolumbar mobility, and mobility tolerance as noted by subjective report and objective measures. A 9 point increase in his FOTO Functional Status measure (IE: 54; SD: 63) further reflects improvement in his functional mobility and activity capacity since the start of his POC. He continues to present with impairments in pain, thoracolumbar mobility, flexibility, core/abdominal strength (diastasis recti - bulge), neural sensation (PMH of neuropathy), and LE strength. As a result of these impairments, the pt has difficulty with the following functional activities: standing, lifting, carrying, transfers (STS, bed mobility), stair negotiations, as well as participating in ADLs/IALDs and recreational activities (e.g., golf). POC will include manual therapy for flexibility/mobility and symptom modulation, modalities (PRN), TE/TA/NR interventions for functional strength and neuromuscular control, HEP, and pt education. The pt will continue to benefit from skilled physical therapy services to address impairments in order to return to his PLOF without limitations due to pain.    Thank you  for the referral.    Impairments: abnormal or restricted ROM, activity intolerance, impaired physical strength, pain with function, poor posture  and poor body mechanics    Symptom irritability: moderateBarriers to therapy: Neuropathy; HTN; Lymes Disease; PMH includes LLE Total Hip Replacement, RLE/LLE total knee replacements --- 2019: Laminectomy C3; Laminectomy L3 and Fusion L4-L5  Understanding of Dx/Px/POC: good   Prognosis: good    Goals  STGs: Achieve within 5 weeks  1. Increase functional LE strength to at least 4+/5 for each major muscle group in order to reflect improved functional strength for ADLs/IADLs.  2. Decrease pain to no greater than 4/10 on the NPRS in order to reflect improved activity capacity/tolerance.  3. Independent with HEP to aid in continued progress between treatment sessions.   4. Increase FOTO Functional Status measure by at least 5 points in order to reflect improvements in functional status and activity capacity.    LTGs: Achieve within 10 weeks  1. Decrease pain to no greater than 2/10 on the NPRS in order to reflect improved activity capacity/tolerance.  2. Progress to at least 90% return to PLOF per pt report in order to reflect improvements in functional status and activity capacity.  3. Increase FOTO Functional Status measure score to at least benchmark score.  4. Independent with HEP for long-term management of symptoms and functional mobility.      Plan  Patient would benefit from: skilled physical therapy  Referral necessary: Yes  Planned modality interventions: thermotherapy: hydrocollator packs  Planned therapy interventions: abdominal trunk stabilization, joint mobilization, manual therapy, balance/weight bearing training, body mechanics training, neuromuscular re-education, patient education, postural training, flexibility, strengthening, stretching, functional ROM exercises, therapeutic activities, therapeutic exercise and home exercise program  Frequency: 1x  "week  Duration in weeks: 10  Plan of Care beginning date: 2024  Plan of Care expiration date: 5/3/2024  Treatment plan discussed with: patient        Subjective Evaluation    History of Present Illness  Date of onset: 1/15/2024  Mechanism of injury:   PROGRESS REPORT:   Pt reports low back pain on right side. Pt denies tingling/numbness down LE. -- Pt reports that he is going to [employer] every 3rd day (better than every other day to allow for rest). Pt has some pain with elliptical, but no pain with other exercises. -- Back pain does not wake him up at night.    INITIAL EVALUATION:  Pt reports symptoms of chronic LBP with a recent increase in pain over the past 4-6 weeks after carrying/lifting [employer] tree out of the house. -- Pt reports a history of LBP (R side > L side).      Chronic LE knee pain (L > R) pain, particularly with going up stairs. Pt denies that the knee give out and notes that his knee feel stable.    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 (no medical plans); Multiple steroid injections for low back; Ablation ( - \"that did not do anything at all\")          Recurrent probem    Patient Goals  Patient goals for therapy: decreased pain, increased motion, independence with ADLs/IADLs and return to sport/leisure activities  Patient goal: Golf (currently can tolerate 9 holes)  Pain  Current pain ratin  At best pain ratin  At worst pain ratin  Quality: dull ache  Relieving factors: relaxation, rest and change in position (Cat-cow; HEP)  Aggravating factors: standing, stair climbing and lifting (Carrying; Lifting/carrying fire wood; Standing (tolerance: 30 minutes at most); Walking (hurts both knees and back); Getting out of bed in morning; Getting out of a chair; Self-care/hygiene (IA: Improvement - \"especially if I do some stretches\"))  Progression: improved    Social Support  Steps to enter house: yes (Front: 1; Back: 13)  Stairs in house: yes (2 sets: 13 steps)   Lives in: " "multiple-level home  Lives with: spouse    Employment status: not working ()  Treatments  Previous treatment: injection treatment and medication  Current treatment: injection treatment, medication and physical therapy  Current treatment comments: Injection in left shoulder within past year.         Objective     Concurrent Complaints  Negative for night pain, disturbed sleep, bladder dysfunction and bowel dysfunction    Static Posture     Head  Forward.    Shoulders  Depressed and rounded.    Thoracic Spine  Hyperkyphosis.    Lumbar Spine   Flattened and decreased lordosis.     Postural Observations  Seated posture: fair  Standing posture: fair      Palpation     Right   Tenderness of the erector spinae, lumbar interspinals, lumbar paraspinals and quadratus lumborum.     Tenderness     Lumbar Spine  No tenderness in the spinous process.     Right Hip   Tenderness in the sacroiliac joint.     Neurological Testing     Sensation     Lumbar   Left   Intact: light touch  Diminished: light touch    Right   Intact: light touch    Comments   Left light touch: Diminished along medial lower leg    Active Range of Motion     Lumbar   Flexion:  Restriction level: minimal  Extension: Active lumbar extension: Pain: \"That's the worst\"  with pain Restriction level: minimal  Left lateral flexion:  Restriction level: minimal  Right lateral flexion:  Restriction level: minimal  Left rotation: Active left lumbar rotation: pain left side.  with pain Restriction level: minimal  Right rotation: Active right lumbar rotation: pain right side.  with pain Restriction level: moderate    Joint Play     Hypomobile: L1, L2, L3, L4, L5 and S1     Strength/Myotome Testing     Left Hip   Planes of Motion   Flexion: 4 (Back pain -- assessed seated)    Right Hip   Planes of Motion   Flexion: 4 (Back pain -- Assessed seated)    Left Knee   Flexion: 4+  Extension: 5    Right Knee   Flexion: 4+  Extension: 5    Left Ankle/Foot " "  Dorsiflexion: 5    Right Ankle/Foot   Dorsiflexion: 5    Tests     Lumbar     Left   Negative slump test.     Right   Negative slump test.     Left Hip   Negative NIKKI and FADIR.     Right Hip   Negative NIKKI and FADIR.              Precautions: Neuropathy; HTN; Lymes Disease; PMH includes LLE Total Hip Replacement, RLE/LLE total knee replacements --- 2019: Laminectomy C3; Laminectomy L3 and Fusion L4-L5       2/29 3/7 3/14 3/21  3/28        Manuals             STM/MFR  KS KS RA KS        Lumbar PA  KS KS RA KS (+ SIJ)                                  Neuro Re-Ed             Sciatic Nerve Glide - Supine 10x ea LE            TA Activation/Core brace   2x10 (w/ exhale) 2x10 w/ exhale         TA + Iso Hip ADD   2x10 (w/ exhale) 2x10 5\"         TA + Bent Knee Fallout   Green TB 2x10 Green TB 2x10         Hook-lying Core Brace + March     2x10 ea LE, alternating        Core Brace + SLR     2x10 ea LE                     Ther Ex             Bent Knee Fallout 10x ea LE 10x ea LE           PPT 10x 15x 2x10 2x10          Open book  10x ea dir 10x ea dir  15x ea dir        LTR  15x ea dir 10 ea direction 10 ea direction  10 ea dir        Seated Lumbar Flex w/ PB             Mid Row             Shld Ext/Lat Pulldown             Shld Ext AAROM w/ Cane - w/ mild trunk rotation  15x w/ 3\" hold ea UE           LE Bike  L1 5' L1 6' L1 6'  L0/L1 7'                                  Anatomy as it relates to pt's symptoms/presentation KS KS           Address pt's questions PRN KS KS   KS        HEP KS KS   KS        Update on pt's status/symptoms  KS KS                       Ther Activity             FOTO Questionnaire     KS        UT: Update on pt's symptoms/status/function     KS        UT: Objective assessment     KS                     Gait Training                                       Modalities                                            "

## 2024-04-04 ENCOUNTER — TELEPHONE (OUTPATIENT)
Dept: NEUROLOGY | Facility: CLINIC | Age: 79
End: 2024-04-04

## 2024-04-04 ENCOUNTER — OFFICE VISIT (OUTPATIENT)
Dept: PHYSICAL THERAPY | Facility: CLINIC | Age: 79
End: 2024-04-04
Payer: MEDICARE

## 2024-04-04 DIAGNOSIS — G89.29 CHRONIC BILATERAL LOW BACK PAIN WITHOUT SCIATICA: Primary | ICD-10-CM

## 2024-04-04 DIAGNOSIS — G62.9 NEUROPATHY: ICD-10-CM

## 2024-04-04 DIAGNOSIS — I10 HYPERTENSION, UNSPECIFIED TYPE: ICD-10-CM

## 2024-04-04 DIAGNOSIS — M54.50 CHRONIC BILATERAL LOW BACK PAIN WITHOUT SCIATICA: Primary | ICD-10-CM

## 2024-04-04 PROCEDURE — 97140 MANUAL THERAPY 1/> REGIONS: CPT

## 2024-04-04 PROCEDURE — 97110 THERAPEUTIC EXERCISES: CPT

## 2024-04-04 NOTE — TELEPHONE ENCOUNTER
----- Message from Quirino Ott MD sent at 1/18/2024  8:51 PM EST -----  Diabetes test borderline; labs look good otherwise. Should discuss with PCP

## 2024-04-04 NOTE — PROGRESS NOTES
Daily Note     Today's date: 2024  Patient name: Kevin Reid  : 1945  MRN: [id number]  Referring provider: Quirino Ott MD  Dx:   Encounter Diagnosis     ICD-10-CM    1. Chronic bilateral low back pain without sciatica  M54.50     G89.29       2. Hypertension, unspecified type  I10       3. Neuropathy  G62.9           Start Time: 0800  Stop Time: 08  Total time in clinic (min): 43 minutes    Subjective: The pt reports that his back feels about the same. He notes an onset/increase in upper back pain on the left side after he was at the gym. He attributes the pain to the row machine. He took a few days off from the gym and his pain has improved. -- Pt reports that he is doing his home exercise and that they are going well.      Objective: See treatment diary below      Assessment: The pt participated in a skilled physical therapy session that focused manual intervetion, neuromusuclar re-education, and therapeutic exercise. Mid rows and shoulder extension/lat pull-downs were introduced during today's treatment session to address impairments in strength and endurance of postural/thoracolumbar musculature necessary for ADLs/IADLs (e.g., lifting, carrying, standing endurance, ambulating). Side-lying clamshells and reverse clamshells were added to improve strength proximal LE musculature as well as lumbopelvic/hip mobility. He tolerated treatment well. The pt would benefit from continued PT to address impairments in order to improve his quality of life and return to his PLOF without limitations due to pain.      Plan: Continue per plan of care.      Precautions: Neuropathy; HTN; Lymes Disease; PMH includes LLE Total Hip Replacement, RLE/LLE total knee replacements --- 2019: Laminectomy C3; Laminectomy L3 and Fusion L4-L5       2/29 3/7 3/14 3/21  3/28 4/4       Manuals             STM/MFR  KS KS RA KS KS       Lumbar PA  KS WILLIAMS KRAMER (+ SIJ) KS (+ SIJ)                                 Neuro Re-Ed       "       Sciatic Nerve Glide - Supine 10x ea LE            TA Activation/Core brace   2x10 (w/ exhale) 2x10 w/ exhale         TA + Iso Hip ADD   2x10 (w/ exhale) 2x10 5\"         TA + Bent Knee Fallout   Green TB 2x10 Green TB 2x10         Hook-lying Core Brace + March     2x10 ea LE, alternating        Core Brace + SLR     2x10 ea LE        Paloff Press      Blue TB 10x w/ 3\" hold ea dir.                    Ther Ex             Bent Knee Fallout 10x ea LE 10x ea LE           PPT 10x 15x 2x10 2x10          Open book  10x ea dir 10x ea dir  15x ea dir        LTR  15x ea dir 10 ea direction 10 ea direction  10 ea dir        Seated Lumbar Flex w/ PB             Mid Row      Black TB 2x10       Shld Ext/Lat Pulldown      Blue TB 3x10       Shld Ext AAROM w/ Cane - w/ mild trunk rotation  15x w/ 3\" hold ea UE           LE Bike  L1 5' L1 6' L1 6'  L0/L1 7' L1 8'       Clamshell in s/l      3# 2x10       Reverse clamshell in s/l      2x10                    Anatomy as it relates to pt's symptoms/presentation KS KS    KS       Address pt's questions PRN KS KS   KS KS       HEP KS KS   KS KS       Update on pt's status/symptoms  KS KS   KS                    Ther Activity             FOTO Questionnaire     KS        SD: Update on pt's symptoms/status/function     KS        SD: Objective assessment     KS                     Gait Training                                       Modalities                                            "

## 2024-04-04 NOTE — TELEPHONE ENCOUNTER
----- Message from Quirino Ott MD sent at 1/25/2024  9:33 AM EST -----  Routine follow-up please         --------------------------------------    Patient seen Dr. Ott on 2/19/24 for a follow up regarding his EMG results, per pt request on 2/9/24

## 2024-04-18 ENCOUNTER — OFFICE VISIT (OUTPATIENT)
Dept: PHYSICAL THERAPY | Facility: CLINIC | Age: 79
End: 2024-04-18
Payer: MEDICARE

## 2024-04-18 DIAGNOSIS — M54.50 CHRONIC BILATERAL LOW BACK PAIN WITHOUT SCIATICA: Primary | ICD-10-CM

## 2024-04-18 DIAGNOSIS — G62.9 NEUROPATHY: ICD-10-CM

## 2024-04-18 DIAGNOSIS — I10 HYPERTENSION, UNSPECIFIED TYPE: ICD-10-CM

## 2024-04-18 DIAGNOSIS — G89.29 CHRONIC BILATERAL LOW BACK PAIN WITHOUT SCIATICA: Primary | ICD-10-CM

## 2024-04-18 PROCEDURE — 97140 MANUAL THERAPY 1/> REGIONS: CPT

## 2024-04-18 PROCEDURE — 97110 THERAPEUTIC EXERCISES: CPT

## 2024-04-18 NOTE — PROGRESS NOTES
"Daily Note     Today's date: 2024  Patient name: Kevin Reid  : 1945  MRN: [id number]  Referring provider: Quirino Ott MD  Dx: No diagnosis found.               Subjective: Patient reports his symptoms are about the same since last visit.  C/o bilateral LB pain R>L at about a 7-8 pain intensity.  Denies radicular LB symptoms and did not take any pain meds.        Objective: See treatment diary below      Assessment: Tolerated treatment well.  No trigger areas and no Patient complaints noted during the STM.  Good technique overall with the therapeutic exercises.   Patient would benefit from continued PT      Plan: Continue per plan of care.      Precautions: Neuropathy; HTN; Lymes Disease; PMH includes LLE Total Hip Replacement, RLE/LLE total knee replacements --- 2019: Laminectomy C3; Laminectomy L3 and Fusion L4-L5       2/29 3/7 3/14 3/21  3/28 4/4 4/18      Manuals             STM/MFR  KS KS RA KS KS [place]      Lumbar PA  KS KS RA KS (+ SIJ) KS (+ SIJ)                                 Neuro Re-Ed             Sciatic Nerve Glide - Supine 10x ea LE            TA Activation/Core brace   2x10 (w/ exhale) 2x10 w/ exhale   2x10 w/  exhale      TA + Iso Hip ADD   2x10 (w/ exhale) 2x10 5\"   2x10  5\"      TA + Bent Knee Fallout   Green TB 2x10 Green TB 2x10   2x10      Hook-lying Core Brace + March     2x10 ea LE, alternating  2x10 ea LE, ALT      Core Brace + SLR     2x10 ea LE  2x10 ea LE      Paloff Press      Blue TB 10x w/ 3\" hold ea dir. Blue TB 10x w/3\" hold ea dir.                   Ther Ex             Bent Knee Fallout 10x ea LE 10x ea LE           PPT 10x 15x 2x10 2x10          Open book  10x ea dir 10x ea dir  15x ea dir  15x ea dir      LTR  15x ea dir 10 ea direction 10 ea direction  10 ea dir  10 ea dir      Seated Lumbar Flex w/ PB             Mid Row      Black TB 2x10 Black TB 2x10      Shld Ext/Lat Pulldown      Blue TB 3x10 Blue TB 3x10      Shld Ext AAROM w/ Cane - w/ mild trunk " "rotation  15x w/ 3\" hold ea UE           LE Bike  L1 5' L1 6' L1 6'  L0/L1 7' L1 8' L1 8'      Clamshell in s/l      3# 2x10       Reverse clamshell in s/l      2x10                    Anatomy as it relates to pt's symptoms/presentation KS KS    KS       Address pt's questions PRN KS KS   KS KS [place]      HEP KS KS   KS KS       Update on pt's status/symptoms  KS KS   KS                    Ther Activity             FOTO Questionnaire     KS        [place]: Update on pt's symptoms/status/function     KS        [place]: Objective assessment     KS                     Gait Training                                       Modalities                                              "

## 2024-04-25 ENCOUNTER — OFFICE VISIT (OUTPATIENT)
Dept: PHYSICAL THERAPY | Facility: CLINIC | Age: 79
End: 2024-04-25
Payer: MEDICARE

## 2024-04-25 DIAGNOSIS — I10 HYPERTENSION, UNSPECIFIED TYPE: ICD-10-CM

## 2024-04-25 DIAGNOSIS — M54.50 CHRONIC BILATERAL LOW BACK PAIN WITHOUT SCIATICA: Primary | ICD-10-CM

## 2024-04-25 DIAGNOSIS — G89.29 CHRONIC BILATERAL LOW BACK PAIN WITHOUT SCIATICA: Primary | ICD-10-CM

## 2024-04-25 DIAGNOSIS — G62.9 NEUROPATHY: ICD-10-CM

## 2024-04-25 PROCEDURE — 97110 THERAPEUTIC EXERCISES: CPT

## 2024-04-25 PROCEDURE — 97140 MANUAL THERAPY 1/> REGIONS: CPT

## 2024-04-25 NOTE — PROGRESS NOTES
"Daily Note     Today's date: 2024  Patient name: Kevin Reid  : 1945  MRN: [id number]  Referring provider: Quirino Ott MD  Dx:   Encounter Diagnosis     ICD-10-CM    1. Chronic bilateral low back pain without sciatica  M54.50     G89.29       2. Neuropathy  G62.9       3. Hypertension, unspecified type  I10           Start Time: 805  Stop Time: 845  Total time in clinic (min): 40 minutes    Subjective: The pt reports that his \"back hurts more this morning because I was doing some digging in the garden\" in the early afternoon yesterday. He explains that he was working with the shovel outside for about an hour. -- He also worked from about 5-7:30 PM yesterday evening on dock repair projects (e.g., cutting carpet). Pt denies pain while participating in those activities; \"it was only when I woke up.\" -- Pt note that his pain is not constant and occasionally travels from the right side of the low back to the left side. He is currently able to walk for about 1 mile \"before it starts to bother me.\"      Objective: See treatment diary below      Assessment: The pt participated in a skilled physical therapy session that focused manual intervetion, neuromusuclar re-education, and therapeutic exercise. In response to improvements in activity capacity between treatment sessions, the pt's program was progressed. Mid rows and shoulder extension were preformed standing on a foam pad to increase core activation and challenge static/dynamic balance. Standing hip AROM interventions were added to improve strength of proximal LE musculature necessary for functional activities (e.g., STS transfers, stair negotiations, and static/dynamic balance). He tolerated treatment well. The pt would benefit from continued PT to address impairments in order to improve his quality of life and return to his PLOF without limitations due to pain.       Plan: Continue per plan of care.      Precautions: Neuropathy; HTN; Lymes " "Disease; PMH includes LLE Total Hip Replacement, RLE/LLE total knee replacements --- 2019: Laminectomy C3; Laminectomy L3 and Fusion L4-L5       2/29 3/7 3/14 3/21  3/28 4/4 4/18 4/25     Manuals             STM/MFR  KS KS RA KS KS KY KS     Lumbar PA  KS KS RA KS (+ SIJ) KS (+ SIJ)  KS                               Neuro Re-Ed             Sciatic Nerve Glide - Supine 10x ea LE            TA Activation/Core brace   2x10 (w/ exhale) 2x10 w/ exhale   2x10 w/  exhale      TA + Iso Hip ADD   2x10 (w/ exhale) 2x10 5\"   2x10  5\"      TA + Bent Knee Fallout   Green TB 2x10 Green TB 2x10   2x10      Hook-lying Core Brace + March     2x10 ea LE, alternating  2x10 ea LE, ALT      Core Brace + SLR     2x10 ea LE  2x10 ea LE      Paloff Press      Blue TB 10x w/ 3\" hold ea dir. Blue TB 10x w/3\" hold ea dir. Green TB 2x10 w/ 5\" hold ea dir.                  Ther Ex             Bent Knee Fallout 10x ea LE 10x ea LE           PPT 10x 15x 2x10 2x10          Open book  10x ea dir 10x ea dir  15x ea dir  15x ea dir 10x ea dir     LTR  15x ea dir 10 ea direction 10 ea direction  10 ea dir  10 ea dir      Seated Lumbar Flex w/ PB             Mid Row      Black TB 2x10 Black TB 2x10 Silver TB 3x12 (standing on foam)     Shld Ext/Lat Pulldown      Blue TB 3x10 Blue TB 3x10 Black TB 3x12 (standing on foam)     Shld Ext AAROM w/ Cane - w/ mild trunk rotation  15x w/ 3\" hold ea UE           LE Bike  L1 5' L1 6' L1 6'  L0/L1 7' L1 8' L1 8' L2 8'     Clamshell in s/l      3# 2x10       Standing Heel Raises        3x10     Standing Hip Ext        3# 2x10 ea LE     Standing Hip ABD        3# 2x10 ea LE                               Reverse clamshell in s/l      2x10                    Anatomy as it relates to pt's symptoms/presentation KS KS    KS       Address pt's questions PRN KS KS   KS KS KY KS     HEP KS KS   KS KS  KS     Update on pt's status/symptoms  KS KS   KS  KS                  Ther Activity             FOTO Questionnaire     KS   "      TX: Update on pt's symptoms/status/function     KS        TX: Objective assessment     KS                     Gait Training                                       Modalities

## 2024-05-02 ENCOUNTER — OFFICE VISIT (OUTPATIENT)
Dept: PHYSICAL THERAPY | Facility: CLINIC | Age: 79
End: 2024-05-02
Payer: MEDICARE

## 2024-05-02 DIAGNOSIS — I10 HYPERTENSION, UNSPECIFIED TYPE: ICD-10-CM

## 2024-05-02 DIAGNOSIS — G62.9 NEUROPATHY: ICD-10-CM

## 2024-05-02 DIAGNOSIS — M54.50 CHRONIC BILATERAL LOW BACK PAIN WITHOUT SCIATICA: Primary | ICD-10-CM

## 2024-05-02 DIAGNOSIS — G89.29 CHRONIC BILATERAL LOW BACK PAIN WITHOUT SCIATICA: Primary | ICD-10-CM

## 2024-05-02 PROCEDURE — 97530 THERAPEUTIC ACTIVITIES: CPT

## 2024-05-02 PROCEDURE — 97140 MANUAL THERAPY 1/> REGIONS: CPT

## 2024-05-02 PROCEDURE — 97110 THERAPEUTIC EXERCISES: CPT

## 2024-05-02 NOTE — PROGRESS NOTES
PT Progress Report/Update POC    Today's date: 2024  Patient name: Kevin Reid  : 1945  MRN: [id number]  Referring provider: Quirino Ott MD  Dx:   Encounter Diagnosis     ICD-10-CM    1. Chronic bilateral low back pain without sciatica  M54.50     G89.29       2. Neuropathy  G62.9       3. Hypertension, unspecified type  I10               Start Time: 804  Stop Time: 846  Total time in clinic (min): 42 minutes    Assessment  Assessment details: The pt is a 78 year old male who presents to skilled physical therapy services due to a decline in functional status related to an onset of acute on chronic LBP associated with lifting/carrying [employer] tree in January. At this point in his POC, the pt has received 9 skilled physical therapy sessions. Upon completion of the KS, the pt presents with a recent increase in back pain after working on maintenance tasks for this boat this past weekend. He continues to present with impairments in pain, thoracolumbar mobility, flexibility, core/abdominal strength (diastasis recti - bulge), neural sensation (PMH of neuropathy), and LE strength. As a result of these impairments, the pt has difficulty with the following functional activities: standing, lifting, carrying, transfers (STS, bed mobility), stair negotiations, as well as participating in ADLs/IALDs and recreational activities (e.g., golf, boat maintenance). POC will include manual therapy for flexibility/mobility and symptom modulation, modalities (PRN), TE/TA/NR interventions for functional strength and neuromuscular control, HEP, and pt education. The pt will continue to benefit from skilled physical therapy services to address impairments in order to return to his PLOF without limitations due to pain. Considering the chronicity of the pt's symptoms as well as his PMH (e.g., laminectomy/fusion lumbar spine), progress is slower and he will benefit from an extended POC when compared to a pt who is less  medically complex.    Thank you for the referral.    Impairments: abnormal or restricted ROM, activity intolerance, impaired physical strength, pain with function, poor posture  and poor body mechanics    Symptom irritability: moderateBarriers to therapy: Neuropathy; HTN; Lymes Disease; PMH includes LLE Total Hip Replacement, RLE/LLE total knee replacements --- 2019: Laminectomy C3; Laminectomy L3 and Fusion L4-L5  Understanding of Dx/Px/POC: good   Prognosis: good    Goals  STGs: Achieve within 5 weeks  1. Increase functional LE strength to at least 4+/5 for each major muscle group in order to reflect improved functional strength for ADLs/IADLs.   (ONGOING)  2. Decrease pain to no greater than 4/10 on the NPRS in order to reflect improved activity capacity/tolerance.    (ONGOING)  3. Independent with HEP to aid in continued progress between treatment sessions.   (PROGRESSING)  4. Increase FOTO Functional Status measure by at least 5 points in order to reflect improvements in functional status and activity capacity.    (ONGOING)    LTGs: Achieve by discharge  1. Decrease pain to no greater than 2/10 on the NPRS in order to reflect improved activity capacity/tolerance.   (ONGOING)  2. Progress to at least 90% return to PLOF per pt report in order to reflect improvements in functional status and activity capacity.       (ONGOING)  3. Increase FOTO Functional Status measure score to at least benchmark score.    (ONGOING)  4. Independent with HEP for long-term management of symptoms and functional mobility.    (ONGOING)      Plan  Plan details: 5/2/2024: Request an additional 6 weeks at a frequency of 1x/wk to address remaining impairments  Patient would benefit from: skilled physical therapy  Referral necessary: Yes  Planned modality interventions: thermotherapy: hydrocollator packs  Planned therapy interventions: abdominal trunk stabilization, joint mobilization, manual therapy, balance/weight bearing training, body  "mechanics training, neuromuscular re-education, patient education, postural training, flexibility, strengthening, stretching, functional ROM exercises, therapeutic activities, therapeutic exercise and home exercise program  Frequency: 1x week  Plan of Care beginning date: 2/29/2024  Plan of Care expiration date: 6/14/2024  Treatment plan discussed with: patient        Subjective Evaluation    History of Present Illness  Date of onset: 1/15/2024  Mechanism of injury:   PROGRESS REPORT: 5/2/2024  \"I tweaked my back this weekend.\" Pt explains that he was polishing his boat. He notes significant pain in the right side of of his low back and an on onset of aching pain down the LLE. Pt notes that he was out working on the boat for about 3 hours. Pt has used heat, ice, and Tylenol to help mange pain. -- Pt has not played golf recently (personal goal).      PROGRESS REPORT: 3/28/2024  Pt reports low back pain on right side. Pt denies tingling/numbness down LE. -- Pt reports that he is going to [employer] every 3rd day (better than every other day to allow for rest). Pt has some pain with elliptical, but no pain with other exercises. -- Back pain does not wake him up at night.    INITIAL EVALUATION:  Pt reports symptoms of chronic LBP with a recent increase in pain over the past 4-6 weeks after carrying/lifting [place] tree out of the house. -- Pt reports a history of LBP (R side > L side).      Chronic LE knee pain (L > R) pain, particularly with going up stairs. Pt denies that the knee give out and notes that his knee feel stable.    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 (no medical plans); Multiple steroid injections for low back; Ablation (2023 - \"that did not do anything at all\")          Recurrent probem    Patient Goals  Patient goals for therapy: decreased pain, increased motion, independence with ADLs/IADLs and return to sport/leisure activities  Patient goal: Golf (currently can tolerate 9 " "holes)  Pain  Current pain ratin  At best pain ratin  At worst pain ratin  Quality: dull ache and radiating  Relieving factors: relaxation, rest and change in position (Cat-cow; HEP)  Aggravating factors: standing, stair climbing and lifting (Carrying; Lifting/carrying fire wood; Standing (tolerance: 30 minutes at most); Walking (hurts both knees and back); Getting out of bed in morning; Getting out of a chair; Self-care/hygiene (MA: Improvement - \"especially if I do some stretches\"))  Progression: improved    Social Support  Steps to enter house: yes (Front: 1; Back: 13)  Stairs in house: yes (2 sets: 13 steps)   Lives in: multiple-level home  Lives with: spouse    Employment status: not working ()  Treatments  Previous treatment: injection treatment and medication  Current treatment: injection treatment, medication and physical therapy  Current treatment comments: Injection in left shoulder within past year.         Objective     Concurrent Complaints  Negative for night pain, disturbed sleep, bladder dysfunction and bowel dysfunction    Static Posture     Head  Forward.    Shoulders  Depressed and rounded.    Thoracic Spine  Hyperkyphosis.    Lumbar Spine   Flattened and decreased lordosis.     Postural Observations  Seated posture: fair  Standing posture: fair      Palpation     Right   Tenderness of the erector spinae, lumbar interspinals, lumbar paraspinals and quadratus lumborum.     Tenderness     Lumbar Spine  No tenderness in the spinous process.     Right Hip   Tenderness in the PSIS and sacroiliac joint.     Neurological Testing     Sensation     Lumbar   Left   Intact: light touch  Diminished: light touch    Right   Intact: light touch    Comments   Left light touch: Diminished along medial lower leg    Active Range of Motion     Lumbar   Flexion: Active lumbar flexion: Ache.  Restriction level: moderate  Extension: Active lumbar extension: Pain: \"That's the worst\"  with " "pain Restriction level: moderate  Left lateral flexion:  with pain Restriction level: moderate  Right lateral flexion:  Restriction level: moderate  Left rotation: Active left lumbar rotation: Right side thoracolumbar pain.  with pain Restriction level: moderate  Right rotation: Active right lumbar rotation: pain right side.  with pain Restriction level: moderate    Joint Play     Hypomobile: L1, L2, L3, L4, L5 and S1     Strength/Myotome Testing     Left Hip   Planes of Motion   Flexion: 4 (Back pain -- assessed seated)    Right Hip   Planes of Motion   Flexion: 4 (Back pain -- Assessed seated)    Left Knee   Flexion: 4+  Extension: 5    Right Knee   Flexion: 4+  Extension: 5    Left Ankle/Foot   Dorsiflexion: 5    Right Ankle/Foot   Dorsiflexion: 5    Tests     Lumbar     Left   Negative slump test.     Right   Negative slump test.     Left Hip   Negative NIKKI and FADIR.     Right Hip   Negative NIKKI and FADIR.       Flowsheet Rows      Flowsheet Row Most Recent Value   PT/OT G-Codes    Current Score 54   Projected Score 59               Precautions: Neuropathy; HTN; Lymes Disease; PMH includes LLE Total Hip Replacement, RLE/LLE total knee replacements --- 2019: Laminectomy C3; Laminectomy L3 and Fusion L4-L5       2/29 3/7 3/14 3/21  3/28 4/4 4/18 4/25 5/2    Manuals             STM/MFR  KS KS RA KS KS KY KS KS (lumbar; posterior pelvis)    Lumbar PA  KS KS RA KS (+ SIJ) KS (+ SIJ)  KS KS                              Neuro Re-Ed             Sciatic Nerve Glide - Supine 10x ea LE            TA Activation/Core brace   2x10 (w/ exhale) 2x10 w/ exhale   2x10 w/  exhale      TA + Iso Hip ADD   2x10 (w/ exhale) 2x10 5\"   2x10  5\"      TA + Bent Knee Fallout   Green TB 2x10 Green TB 2x10   2x10      Hook-lying Core Brace + March     2x10 ea LE, alternating  2x10 ea LE, ALT      Core Brace + SLR     2x10 ea LE  2x10 ea LE      Paloff Press      Blue TB 10x w/ 3\" hold ea dir. Blue TB 10x w/3\" hold ea dir. Green TB 2x10 " "w/ 5\" hold ea dir.                  Ther Ex             Bent Knee Fallout 10x ea LE 10x ea LE           PPT 10x 15x 2x10 2x10          Open book  10x ea dir 10x ea dir  15x ea dir  15x ea dir 10x ea dir     LTR  15x ea dir 10 ea direction 10 ea direction  10 ea dir  10 ea dir  10x ea dir    Seated Lumbar Flex w/ PB             Mid Row      Black TB 2x10 Black TB 2x10 Silver TB 3x12 (standing on foam)     Shld Ext/Lat Pulldown      Blue TB 3x10 Blue TB 3x10 Black TB 3x12 (standing on foam)     Shld Ext AAROM w/ Cane - w/ mild trunk rotation  15x w/ 3\" hold ea UE           LE Bike  L1 5' L1 6' L1 6'  L0/L1 7' L1 8' L1 8' L2 8' L2 8'    Clamshell in s/l      3# 2x10       Standing Heel Raises        3x10     Standing Hip Ext        3# 2x10 ea LE     Standing Hip ABD        3# 2x10 ea LE                               Reverse clamshell in s/l      2x10                    Anatomy as it relates to pt's symptoms/presentation KS KS    KS   KS    Address pt's questions PRN KS KS   KS KS KY KS KS    HEP KS KS   KS KS  KS KS    Update on pt's status/symptoms  KS KS   KS  KS KS                 Ther Activity             FOTO Questionnaire     KS    KS    OR: Update on pt's symptoms/status/function     KS    KS    OR: Objective assessment     KS    KS                 Gait Training                                       Modalities                                              "

## 2024-05-09 ENCOUNTER — OFFICE VISIT (OUTPATIENT)
Dept: PHYSICAL THERAPY | Facility: CLINIC | Age: 79
End: 2024-05-09
Payer: MEDICARE

## 2024-05-09 DIAGNOSIS — I10 HYPERTENSION, UNSPECIFIED TYPE: ICD-10-CM

## 2024-05-09 DIAGNOSIS — M54.50 CHRONIC BILATERAL LOW BACK PAIN WITHOUT SCIATICA: Primary | ICD-10-CM

## 2024-05-09 DIAGNOSIS — G62.9 NEUROPATHY: ICD-10-CM

## 2024-05-09 DIAGNOSIS — G89.29 CHRONIC BILATERAL LOW BACK PAIN WITHOUT SCIATICA: Primary | ICD-10-CM

## 2024-05-09 PROCEDURE — 97530 THERAPEUTIC ACTIVITIES: CPT | Performed by: PHYSICAL THERAPIST

## 2024-05-09 PROCEDURE — 97140 MANUAL THERAPY 1/> REGIONS: CPT | Performed by: PHYSICAL THERAPIST

## 2024-05-09 PROCEDURE — 97110 THERAPEUTIC EXERCISES: CPT | Performed by: PHYSICAL THERAPIST

## 2024-05-09 NOTE — PROGRESS NOTES
"Daily Note     Today's date: 2024  Patient name: Kevin Reid  : 1945  MRN: [id number]  Referring provider: Quirino Ott MD  Dx:   Encounter Diagnosis     ICD-10-CM    1. Chronic bilateral low back pain without sciatica  M54.50     G89.29       2. Neuropathy  G62.9       3. Hypertension, unspecified type  I10             Subjective: The pt reports that his back is a little stiff/sore currently, which is typical for the morning.      Objective: See treatment diary below      Assessment: The pt participated in a skilled physical therapy session that focused manual intervetion, neuromusuclar re-education, and therapeutic exercise. Patient continues to present with tenderness over PSIS and SI joint, but tolerated manual therapy well, noting less soreness following. Patient required cuing to avoid trunk compensation with standing hip extension. He tolerated treatment well. The pt would benefit from continued PT to address impairments in order to improve his quality of life and return to his PLOF without limitations due to pain.       Plan: Continue per plan of care.           Precautions: Neuropathy; HTN; Lymes Disease; PMH includes LLE Total Hip Replacement, RLE/LLE total knee replacements --- 2019: Laminectomy C3; Laminectomy L3 and Fusion L4-L5       2/29 3/7 3/14 3/21  3/28 4/4 4/18 4/25 5/2 5/9   Manuals             STM/MFR  KS KS RA KS KS KY KS KS (lumbar; posterior pelvis) KS (lumbar; posterior pelvis)   Lumbar PA  KS KS RA KS (+ SIJ) KS (+ SIJ)  KS KS ELIF SIJ                             Neuro Re-Ed             Sciatic Nerve Glide - Supine 10x ea LE            TA Activation/Core brace   2x10 (w/ exhale) 2x10 w/ exhale   2x10 w/  exhale      TA + Iso Hip ADD   2x10 (w/ exhale) 2x10 5\"   2x10  5\"      TA + Bent Knee Fallout   Green TB 2x10 Green TB 2x10   2x10   2x10   Hook-lying Core Brace + March     2x10 ea LE, alternating  2x10 ea LE, ALT      Core Brace + SLR     2x10 ea LE  2x10 ea LE   " "2x10 ea   Paloff Press      Blue TB 10x w/ 3\" hold ea dir. Blue TB 10x w/3\" hold ea dir. Green TB 2x10 w/ 5\" hold ea dir.  Green TB 2x10 w/ 5\" hold ea dir.                Ther Ex             Bent Knee Fallout 10x ea LE 10x ea LE        10 ea   PPT 10x 15x 2x10 2x10          Open book  10x ea dir 10x ea dir  15x ea dir  15x ea dir 10x ea dir 10x ea dir 10x ea dir   LTR  15x ea dir 10 ea direction 10 ea direction  10 ea dir  10 ea dir  10x ea dir 10x ea dir   Seated Lumbar Flex w/ PB             Mid Row      Black TB 2x10 Black TB 2x10 Silver TB 3x12 (standing on foam)  Silver TB 3x12 (standing on foam)   Shld Ext/Lat Pulldown      Blue TB 3x10 Blue TB 3x10 Black TB 3x12 (standing on foam)  Black TB 3x12 (standing on foam)   Shld Ext AAROM w/ Cane - w/ mild trunk rotation  15x w/ 3\" hold ea UE           LE Bike  L1 5' L1 6' L1 6'  L0/L1 7' L1 8' L1 8' L2 8' L2 8' L2 8'   Clamshell in s/l      3# 2x10       Standing Heel Raises        3x10  3x10   Standing Hip Ext        3# 2x10 ea LE  3# 2x10 ea LE   Standing Hip ABD        3# 2x10 ea LE  3# 2x10 ea LE                             Reverse clamshell in s/l      2x10                    Anatomy as it relates to pt's symptoms/presentation KS KS    KS   KS    Address pt's questions PRN KS KS   KS KS KY KS KS    HEP KS KS   KS KS  KS KS    Update on pt's status/symptoms  KS KS   KS  KS KS                 Ther Activity             FOTO Questionnaire     KS    KS    NJ: Update on pt's symptoms/status/function     KS    KS    NJ: Objective assessment     KS    KS                 Gait Training                                       Modalities                                              "

## 2024-05-15 NOTE — PROGRESS NOTES
"Daily Note     Today's date: 2024  Patient name: Kevin Reid  : 1945  MRN: [id number]  Referring provider: Quirino Ott MD  Dx:   Encounter Diagnosis     ICD-10-CM    1. Chronic bilateral low back pain without sciatica  M54.50     G89.29       2. Neuropathy  G62.9       3. Hypertension, unspecified type  I10           Start Time: 848  Stop Time: 930  Total time in clinic (min): 42 minutes    Subjective: The pt reports that his back is feeling \"pretty good\" this morning. He notes symptoms of soreness in his back and thighs for about two days after working out at the gym. -- When asked about hydration, the pt notes that he is not very good with drinking water.      Objective: See treatment diary below      Assessment: The pt participated in a skilled physical therapy session that focused on manual intervention, neuromuscular re-education, and therapeutic exercise. The pt's program was progressed via the addition of weight for SLR and an increase in the number of repetitions for standing hip AROM exercises to further improve LE strength and activity capacity. He tolerated treatment well. The pt would benefit from continued PT to address impairments in order to return to his PLOF without limitations due to pain.       Plan: Continue per plan of care.      Precautions: Neuropathy; HTN; Lymes Disease; PMH includes LLE Total Hip Replacement, RLE/LLE total knee replacements --- 2019: Laminectomy C3; Laminectomy L3 and Fusion L4-L5       5/16    3/28 4/4 4/18 4/25 5/2 5/9   Manuals             STM/MFR KS (lumbar; posterior pelvis)    KS KS KY KS KS (lumbar; posterior pelvis) KS (lumbar; posterior pelvis)   Lumbar PA KS (+ SIJ)    KS (+ SIJ) KS (+ SIJ)  KS KS ELIF SIJ                             Neuro Re-Ed             Sciatic Nerve Glide - Supine             TA Activation/Core brace       2x10 w/  exhale      TA + Iso Hip ADD       2x10  5\"      TA + Bent Knee Fallout Green TB 2x10 w/ 3\" hold      2x10 " "  2x10   Hook-lying Core Brace + March     2x10 ea LE, alternating  2x10 ea LE, ALT      Core Brace + SLR     2x10 ea LE  2x10 ea LE   2x10 ea   Paloff Press      Blue TB 10x w/ 3\" hold ea dir. Blue TB 10x w/3\" hold ea dir. Green TB 2x10 w/ 5\" hold ea dir.  Green TB 2x10 w/ 5\" hold ea dir.                Ther Ex             Bent Knee Fallout          10 ea   PPT             Open book     15x ea dir  15x ea dir 10x ea dir 10x ea dir 10x ea dir   LTR     10 ea dir  10 ea dir  10x ea dir 10x ea dir   Seated Lumbar Flex w/ PB             Mid Row      Black TB 2x10 Black TB 2x10 Silver TB 3x12 (standing on foam)  Silver TB 3x12 (standing on foam)   Shld Ext/Lat Pulldown      Blue TB 3x10 Blue TB 3x10 Black TB 3x12 (standing on foam)  Black TB 3x12 (standing on foam)   Shld Ext AAROM w/ Cane - w/ mild trunk rotation             LE Bike - for lower quarter mobility; endurance L2 8'    L0/L1 7' L1 8' L1 8' L2 8' L2 8' L2 8'   Clamshell in s/l      3# 2x10       Standing Heel Raises 3x10       3x10  3x10   Standing Hip Ext 3# 3x10 ea LE       3# 2x10 ea LE  3# 2x10 ea LE   Standing Hip ABD 3# 3x10 ea LE       3# 2x10 ea LE  3# 2x10 ea LE                             Reverse clamshell in s/l      2x10                    Anatomy as it relates to pt's symptoms/presentation      KS   KS    Address pt's questions PRN     KS KS KY KS KS    HEP KS    KS KS  KS KS    Update on pt's status/symptoms KS     KS  KS KS                 Ther Activity             FOTO Questionnaire     KS    KS    MS: Update on pt's symptoms/status/function     KS    KS    MS: Objective assessment     KS    KS                 Gait Training                                       Modalities                                              "

## 2024-05-16 ENCOUNTER — OFFICE VISIT (OUTPATIENT)
Dept: PHYSICAL THERAPY | Facility: CLINIC | Age: 79
End: 2024-05-16
Payer: MEDICARE

## 2024-05-16 DIAGNOSIS — M54.50 CHRONIC BILATERAL LOW BACK PAIN WITHOUT SCIATICA: Primary | ICD-10-CM

## 2024-05-16 DIAGNOSIS — I10 HYPERTENSION, UNSPECIFIED TYPE: ICD-10-CM

## 2024-05-16 DIAGNOSIS — G89.29 CHRONIC BILATERAL LOW BACK PAIN WITHOUT SCIATICA: Primary | ICD-10-CM

## 2024-05-16 DIAGNOSIS — G62.9 NEUROPATHY: ICD-10-CM

## 2024-05-16 PROCEDURE — 97140 MANUAL THERAPY 1/> REGIONS: CPT

## 2024-05-16 PROCEDURE — 97110 THERAPEUTIC EXERCISES: CPT

## 2024-05-23 ENCOUNTER — OFFICE VISIT (OUTPATIENT)
Dept: PHYSICAL THERAPY | Facility: CLINIC | Age: 79
End: 2024-05-23
Payer: MEDICARE

## 2024-05-23 DIAGNOSIS — M54.50 CHRONIC BILATERAL LOW BACK PAIN WITHOUT SCIATICA: Primary | ICD-10-CM

## 2024-05-23 DIAGNOSIS — G89.29 CHRONIC BILATERAL LOW BACK PAIN WITHOUT SCIATICA: Primary | ICD-10-CM

## 2024-05-23 DIAGNOSIS — G62.9 NEUROPATHY: ICD-10-CM

## 2024-05-23 DIAGNOSIS — I10 HYPERTENSION, UNSPECIFIED TYPE: ICD-10-CM

## 2024-05-23 PROCEDURE — 97140 MANUAL THERAPY 1/> REGIONS: CPT

## 2024-05-23 PROCEDURE — 97110 THERAPEUTIC EXERCISES: CPT

## 2024-05-23 NOTE — PROGRESS NOTES
Daily Note     Today's date: 2024  Patient name: Kevin Reid  : 1945  MRN: [id number]  Referring provider: Quirino Ott MD  Dx:   Encounter Diagnosis     ICD-10-CM    1. Chronic bilateral low back pain without sciatica  M54.50     G89.29       2. Neuropathy  G62.9       3. Hypertension, unspecified type  I10           Start Time: 846  Stop Time: 932  Total time in clinic (min): 46 minutes    Subjective: The pt reports that his back feels achy in the morning. -- Pt notes a progressive onset of LBP when standing at a counter (e.g., preparing a meal).      Objective: See treatment diary below      Assessment: The pt participated in a skilled physical therapy session that focused on manual intervention, neuromuscular re-education, and therapeutic exercise. Standing shoulder extension with marching was added to improve strength of core/trunk musculature necessary for trunk stability. Weight for standing hip AROM was progressed. Shoulder horizontal abduction and PNF D2 shoulder flexion were introduced to address impairments in strength of postural musculature necessary for standing endurance (e.g., standing to prepare meals). He tolerated treatment well. The pt would benefit from continued PT to address impairments in order to return to his PLOF without limitations due to pain.        Plan: Continue per plan of care.  Progress note during next visit.      Precautions: Neuropathy; HTN; Lymes Disease; PMH includes LLE Total Hip Replacement, RLE/LLE total knee replacements --- 2019: Laminectomy C3; Laminectomy L3 and Fusion L4-L5       5/16 5/23   3/28 4/4 4/18 4/25 5/2 5/9   Manuals             STM/MFR KS (lumbar; posterior pelvis) KS (lumbar; posterior pelvis)   KS KS [place] KS KS (lumbar; posterior pelvis) KS (lumbar; posterior pelvis)   Lumbar PA KS (+ SIJ) KS (+ SIJ)   KS (+ SIJ) KS (+ SIJ)  KS KS ELIF SIJ                             Neuro Re-Ed             Sciatic Nerve Glide - Supine             TA  "Activation/Core brace       2x10 w/  exhale      TA + Iso Hip ADD       2x10  5\"      TA + Bent Knee Fallout Green TB 2x10 w/ 3\" hold      2x10   2x10   Hook-lying Core Brace + March     2x10 ea LE, alternating  2x10 ea LE, ALT      Core Brace + SLR     2x10 ea LE  2x10 ea LE   2x10 ea   Paloff Press      Blue TB 10x w/ 3\" hold ea dir. Blue TB 10x w/3\" hold ea dir. Green TB 2x10 w/ 5\" hold ea dir.  Green TB 2x10 w/ 5\" hold ea dir.                Ther Ex             Bent Knee Fallout          10 ea   PPT             Open book     15x ea dir  15x ea dir 10x ea dir 10x ea dir 10x ea dir   LTR     10 ea dir  10 ea dir  10x ea dir 10x ea dir   Seated Lumbar Flex w/ PB             Mid Row  Silver TB 3x12    Black TB 2x10 Black TB 2x10 Silver TB 3x12 (standing on foam)  Silver TB 3x12 (standing on foam)   Shld Ext/Lat Pulldown      Blue TB 3x10 Blue TB 3x10 Black TB 3x12 (standing on foam)  Black TB 3x12 (standing on foam)   Shld Ext + March  Blue TB 3x 10 ea LE, alt           Shld Ext AAROM w/ Cane - w/ mild trunk rotation             LE Bike - for lower quarter mobility; endurance L2 8' L2 8'   L0/L1 7' L1 8' L1 8' L2 8' L2 8' L2 8'   Clamshell in s/l      3# 2x10       Standing Heel Raises 3x10       3x10  3x10   Standing Hip Ext 3# 3x10 ea LE 4# 3x10 ea LE (Standing Off Step)      3# 2x10 ea LE  3# 2x10 ea LE   Standing Hip ABD 3# 3x10 ea LE 4# 3x10 ea LE (Standing Off Step)      3# 2x10 ea LE  3# 2x10 ea LE   Shld horizontal ABD  Green TB 2x10           Standing PND D2 Shld Flec  Green TB 2x10 ea UE           Piriformis Stretch  2x30\"                                     Reverse clamshell in s/l      2x10                    Anatomy as it relates to pt's symptoms/presentation      KS   KS    Address pt's questions PRN  KS   KS KS KY KS KS    HEP KS KS   KS KS  KS KS    Update on pt's status/symptoms KS KS    KS  KS KS                 Ther Activity             FOTO Questionnaire     KS    KS    [place]: Update on pt's " symptoms/status/function     KS    KS    AK: Objective assessment     KS    KS                 Gait Training                                       Modalities

## 2024-05-29 NOTE — PROGRESS NOTES
"Daily Note     Today's date: 2024  Patient name: Kevin Reid  : 1945  MRN: [id number]  Referring provider: Quirino Ott MD  Dx:   Encounter Diagnosis     ICD-10-CM    1. Chronic bilateral low back pain without sciatica  M54.50     G89.29       2. Neuropathy  G62.9       3. Hypertension, unspecified type  I10           Start Time: 847  Stop Time: 930  Total time in clinic (min): 43 minutes    Subjective: Regarding his back, pt reports: \"just the achiness.\" \"It's just there.\"       Objective: See treatment diary below      Assessment: The pt participated in a skilled physical therapy session that focused on manual intervention, neuromuscular re-education, and therapeutic exercise. Side-lying clams were introduced during today's treatment session to address deficits in proximal LE strength necessary for pelvic stability and static/dynamic balance. While performing SLR w/ core brace, the pt presented with abdominal doming, which reflects diastasis recti and weakness in core musculature. He tolerated treatment well. The pt would benefit from continued PT to address impairments in order to return to his PLOF without limitations due to pain.         Plan: Continue per plan of care.      Precautions: Neuropathy; HTN; Lymes Disease; PMH includes LLE Total Hip Replacement, RLE/LLE total knee replacements --- 2019: Laminectomy C3; Laminectomy L3 and Fusion L4-L5          Manuals             STM/MFR KS (lumbar; posterior pelvis) KS (lumbar; posterior pelvis) KS (lumbar; posterior      KS KS (lumbar; posterior pelvis) KS (lumbar; posterior pelvis)   Lumbar PA KS (+ SIJ) KS (+ SIJ) KS (+ SIJ)     KS KS ELIF SIJ                             Neuro Re-Ed             Sciatic Nerve Glide - Supine             TA Activation/Core brace             TA + Iso Hip ADD             TA + Bent Knee Fallout Green TB 2x10 w/ 3\" hold         2x10   Hook-lying Core Brace + March             Core " "Brace + SLR   1.5# 2x10 ea LE       2x10 ea   Paloff Press   Blue TB 2x10 w/ 3\" hold     Green TB 2x10 w/ 5\" hold ea dir.  Green TB 2x10 w/ 5\" hold ea dir.                Ther Ex             Bent Knee Fallout          10 ea   PPT             Open book        10x ea dir 10x ea dir 10x ea dir   LTR         10x ea dir 10x ea dir   Seated Lumbar Flex w/ PB             Mid Row  Silver TB 3x12 Silver TB 3x12 (standing on foam)     Silver TB 3x12 (standing on foam)  Silver TB 3x12 (standing on foam)   Shld Ext/Lat Pulldown   Black TB 3x12 (standing on foam)     Black TB 3x12 (standing on foam)  Black TB 3x12 (standing on foam)   Shld Ext + March  Blue TB 3x 10 ea LE, alt           Shld Ext AAROM w/ Cane - w/ mild trunk rotation             LE Bike - for lower quarter mobility; endurance L2 8' L2 8' L2 8'     L2 8' L2 8' L2 8'   Clamshell in s/l   4# 2x10 ea LE          Standing Heel Raises 3x10       3x10  3x10   Standing Hip Ext 3# 3x10 ea LE 4# 3x10 ea LE (Standing Off Step) 4# 2x10 ea LE (Standing Off Step)     3# 2x10 ea LE  3# 2x10 ea LE   Standing Hip ABD 3# 3x10 ea LE 4# 3x10 ea LE (Standing Off Step)      3# 2x10 ea LE  3# 2x10 ea LE   Shld horizontal ABD  Green TB 2x10           Standing PND D2 Shld Flex  Green TB 2x10 ea UE           Piriformis Stretch  2x30\"                                     Reverse clamshell in s/l                          Anatomy as it relates to pt's symptoms/presentation         KS    Address pt's questions PRN  KS KS     KS KS    HEP KS KS KS     KS KS    Update on pt's status/symptoms KS KS KS     KS KS                 Ther Activity             FOTO Questionnaire         KS    NJ: Update on pt's symptoms/status/function         KS    NJ: Objective assessment         KS                 Gait Training                                       Modalities                                                  "

## 2024-05-30 ENCOUNTER — OFFICE VISIT (OUTPATIENT)
Dept: PHYSICAL THERAPY | Facility: CLINIC | Age: 79
End: 2024-05-30
Payer: MEDICARE

## 2024-05-30 DIAGNOSIS — G89.29 CHRONIC BILATERAL LOW BACK PAIN WITHOUT SCIATICA: Primary | ICD-10-CM

## 2024-05-30 DIAGNOSIS — I10 HYPERTENSION, UNSPECIFIED TYPE: ICD-10-CM

## 2024-05-30 DIAGNOSIS — M54.50 CHRONIC BILATERAL LOW BACK PAIN WITHOUT SCIATICA: Primary | ICD-10-CM

## 2024-05-30 DIAGNOSIS — G62.9 NEUROPATHY: ICD-10-CM

## 2024-05-30 PROCEDURE — 97140 MANUAL THERAPY 1/> REGIONS: CPT

## 2024-05-30 PROCEDURE — 97110 THERAPEUTIC EXERCISES: CPT

## 2024-06-13 ENCOUNTER — OFFICE VISIT (OUTPATIENT)
Dept: PHYSICAL THERAPY | Facility: CLINIC | Age: 79
End: 2024-06-13
Payer: MEDICARE

## 2024-06-13 DIAGNOSIS — I10 HYPERTENSION, UNSPECIFIED TYPE: ICD-10-CM

## 2024-06-13 DIAGNOSIS — G62.9 NEUROPATHY: ICD-10-CM

## 2024-06-13 DIAGNOSIS — G89.29 CHRONIC BILATERAL LOW BACK PAIN WITHOUT SCIATICA: Primary | ICD-10-CM

## 2024-06-13 DIAGNOSIS — M54.50 CHRONIC BILATERAL LOW BACK PAIN WITHOUT SCIATICA: Primary | ICD-10-CM

## 2024-06-13 PROCEDURE — 97530 THERAPEUTIC ACTIVITIES: CPT

## 2024-06-13 PROCEDURE — 97110 THERAPEUTIC EXERCISES: CPT

## 2024-06-13 PROCEDURE — 97140 MANUAL THERAPY 1/> REGIONS: CPT

## 2024-06-13 NOTE — PROGRESS NOTES
PT Progress Report/Update POC    Today's date: 2024  Patient name: Kevin Reid  : 1945  MRN: [id number]  Referring provider: Quirino Ott MD  Dx:   Encounter Diagnosis     ICD-10-CM    1. Chronic bilateral low back pain without sciatica  M54.50     G89.29       2. Neuropathy  G62.9       3. Hypertension, unspecified type  I10                 Start Time: 932  Stop Time: 101  Total time in clinic (min): 42 minutes    Assessment  Impairments: abnormal or restricted ROM, activity intolerance, impaired physical strength, pain with function, poor posture  and poor body mechanics  Symptom irritability: moderate    Assessment details: The pt is a 78 year old male who presents to skilled physical therapy services due to a decline in functional status related to an onset of acute on chronic LBP associated with lifting/carrying [place] tree in January. At this point in his POC, the pt has received 14 skilled physical therapy sessions. Upon completion of the DE, the pt presents with a recent increase in back pain (bilateral) over the past two weeks. Despite pain, pt remains active with ADLs/IADLS and recreational activities (e.g., HEP, going to gym 3x/wk). He continues to present with impairments in pain, thoracolumbar mobility, flexibility, core/abdominal strength (diastasis recti - doming), neural sensation (PMH of neuropathy), and LE strength. As a result of these impairments, the pt has difficulty with the following functional activities: standing, lifting, carrying, transfers (STS, bed mobility), stair negotiations, as well as participating in ADLs/IALDs and recreational activities (e.g., golf, boat maintenance). POC will include manual therapy for flexibility/mobility and symptom modulation, modalities (PRN), TE/TA/NR interventions for functional strength and neuromuscular control, HEP, and pt education. The pt will continue to benefit from skilled physical therapy services to address  impairments in order to return to his PLOF without limitations due to pain. Considering the chronicity of the pt's symptoms as well as his PMH (e.g., laminectomy/fusion lumbar spine), progress is slower and he will benefit from an extended POC when compared to a pt who is less medically complex.    Thank you for the referral.    Barriers to therapy: Neuropathy; HTN; Lymes Disease; PMH includes LLE Total Hip Replacement, RLE/LLE total knee replacements --- 2019: Laminectomy C3; Laminectomy L3 and Fusion L4-L5  Understanding of Dx/Px/POC: good     Prognosis: good    Goals  STGs: Achieve within 5 weeks  1. Increase functional LE strength to at least 4+/5 for each major muscle group in order to reflect improved functional strength for ADLs/IADLs.   (ONGOING)  2. Decrease pain to no greater than 4/10 on the NPRS in order to reflect improved activity capacity/tolerance.    (ONGOING)  3. Independent with HEP to aid in continued progress between treatment sessions.   (MET)  4. Increase FOTO Functional Status measure by at least 5 points in order to reflect improvements in functional status and activity capacity.    (ONGOING)    LTGs: Achieve by discharge  1. Decrease pain to no greater than 2/10 on the NPRS in order to reflect improved activity capacity/tolerance.   (ONGOING)  2. Progress to at least 90% return to PLOF per pt report in order to reflect improvements in functional status and activity capacity.       (ONGOING)  3. Increase FOTO Functional Status measure score to at least benchmark score.    (ONGOING)  4. Independent with HEP for long-term management of symptoms and functional mobility.    (ONGOING)      Plan  Patient would benefit from: skilled physical therapy  Referral necessary: Yes  Planned modality interventions: thermotherapy: hydrocollator packs    Planned therapy interventions: abdominal trunk stabilization, joint mobilization, manual therapy, balance/weight bearing training, body mechanics training,  "neuromuscular re-education, patient education, postural training, flexibility, strengthening, stretching, functional ROM exercises, therapeutic activities, therapeutic exercise and home exercise program    Frequency: 1x week  Plan of Care beginning date: 2/29/2024  Plan of Care expiration date: 6/28/2024  Treatment plan discussed with: patient  Plan details: 6/13/2024: Request an additional 2 weeks at a frequency of 1x/wk to address remaining impairments with the possible discharge within the next 2 weeks pending appointment with physician         Subjective Evaluation    History of Present Illness  Date of onset: 1/15/2024  Mechanism of injury:   PROGRESS REPORT: 6/13/2024  The pt reports a change and increase in his low back pain over the past two weeks. He explains that he has pain across his lower back on both sides with symptoms radiating to the sides of his hips/pelvis and into the L thigh. He is unable to recall a particular event/injury that would have caused the change in his symptoms. -- He notes that he plans to reach out to one of his prior physicians for an assessment and to discuss injections (previous treatment). -- Pt reports 4/10-5/10 pain upon arrival.     PROGRESS REPORT: 5/2/2024  \"I tweaked my back this weekend.\" Pt explains that he was polishing his boat. He notes significant pain in the right side of of his low back and an on onset of aching pain down the LLE. Pt notes that he was out working on the boat for about 3 hours. Pt has used heat, ice, and Tylenol to help mange pain. -- Pt has not played golf recently (personal goal).      PROGRESS REPORT: 3/28/2024  Pt reports low back pain on right side. Pt denies tingling/numbness down LE. -- Pt reports that he is going to [employer] every 3rd day (better than every other day to allow for rest). Pt has some pain with elliptical, but no pain with other exercises. -- Back pain does not wake him up at night.    INITIAL EVALUATION:  Pt reports " "symptoms of chronic LBP with a recent increase in pain over the past 4-6 weeks after carrying/lifting bodaplanes tree out of the house. -- Pt reports a history of LBP (R side > L side).      Chronic LE knee pain (L > R) pain, particularly with going up stairs. Pt denies that the knee give out and notes that his knee feel stable.    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 (no medical plans); Multiple steroid injections for low back; Ablation ( - \"that did not do anything at all\")          Recurrent probem    Patient Goals  Patient goals for therapy: decreased pain, increased motion, independence with ADLs/IADLs and return to sport/leisure activities  Patient goal: Golf (currently can tolerate 9 holes)  Pain  Current pain ratin  At best pain ratin  At worst pain ratin  Location: lower back - bilateral  Quality: dull ache and radiating  Relieving factors: relaxation, rest and change in position (Cat-cow; HEP)  Aggravating factors: standing, stair climbing and lifting (Carrying; Lifting/carrying fire wood; Standing (tolerance: 30 minutes at most); Walking (hurts both knees and back); Getting out of bed in morning; Getting out of a chair; Self-care/hygiene (CA: Improvement - \"especially if I do some stretches\"))  Progression: improved    Social Support  Steps to enter house: yes (Front: 1; Back: 13)  Stairs in house: yes (2 sets: 13 steps)   Lives in: multiple-level home  Lives with: spouse    Employment status: not working ()  Treatments  Previous treatment: injection treatment and medication  Current treatment: injection treatment, medication and physical therapy  Current treatment comments: Injection in left shoulder within past year.         Objective     Concurrent Complaints  Negative for night pain, disturbed sleep, bladder dysfunction and bowel dysfunction    Static Posture     Head  Forward.    Shoulders  Depressed and rounded.    Thoracic Spine  Hyperkyphosis.    Lumbar Spine " "  Flattened and decreased lordosis.     Postural Observations  Seated posture: fair  Standing posture: fair      Palpation     Right   Tenderness of the erector spinae, lumbar interspinals, lumbar paraspinals and quadratus lumborum.     Tenderness     Lumbar Spine  No tenderness in the spinous process.     Right Hip   Tenderness in the PSIS and sacroiliac joint.     Neurological Testing     Sensation     Lumbar   Left   Intact: light touch  Diminished: light touch    Right   Intact: light touch    Comments   Left light touch: Diminished along medial lower leg    Active Range of Motion     Lumbar   Flexion: Active lumbar flexion: Ache.  with pain Restriction level: moderate  Extension: Active lumbar extension: Pain: \"That's the worst\" -- Minimal mobility past neutral spine.  with pain Restriction level: maximal  Left lateral flexion: Active left lumbar lateral flexion: Left side thoracolumbar pain.    with pain Restriction level: moderate  Right lateral flexion: Active right lumbar lateral flexion: pain right side.  Restriction level: moderate  Left rotation:  with pain Restriction level: moderate  Right rotation:  with pain Restriction level: moderate    Joint Play     Hypomobile: L1, L2, L3, L4, L5 and S1     Strength/Myotome Testing     Left Hip   Planes of Motion   Flexion: 4 (Back pain -- assessed seated)    Right Hip   Planes of Motion   Flexion: 4 (Back pain -- Assessed seated)    Left Knee   Flexion: 4+  Extension: 5    Right Knee   Flexion: 4+  Extension: 5    Left Ankle/Foot   Dorsiflexion: 5    Right Ankle/Foot   Dorsiflexion: 5    Tests     Lumbar     Left   Negative slump test.     Right   Negative slump test.     Left Hip   Negative NIKKI and FADIR.     Right Hip   Negative NIKKI and FADIR.     Ambulation   Weight-Bearing Status   Weight-Bearing Status (Left): full weight bearing   Weight-Bearing Status (Right): full weight-bearing    Assistive device used: none               Precautions: Neuropathy; " "HTN; Lymes Disease; PMH includes LLE Total Hip Replacement, RLE/LLE total knee replacements --- 2019: Laminectomy C3; Laminectomy L3 and Fusion L4-L5       5/16 5/23 5/30 6/13 4/25 5/2 5/9   Manuals             STM/MFR KS (lumbar; posterior pelvis) KS (lumbar; posterior pelvis) KS (lumbar; posterior  KS (lumbar; posterior     KS KS (lumbar; posterior pelvis) KS (lumbar; posterior pelvis)   Lumbar PA KS (+ SIJ) KS (+ SIJ) KS (+ SIJ) KS (+ SIJ)    KS KS ELIF SIJ                             Neuro Re-Ed             Sciatic Nerve Glide - Supine             TA Activation/Core brace             TA + Iso Hip ADD             TA + Bent Knee Fallout Green TB 2x10 w/ 3\" hold         2x10   Hook-lying Core Brace + March             Core Brace + SLR   1.5# 2x10 ea LE       2x10 ea   Paloff Press   Blue TB 2x10 w/ 3\" hold     Green TB 2x10 w/ 5\" hold ea dir.  Green TB 2x10 w/ 5\" hold ea dir.                Ther Ex             Bent Knee Fallout          10 ea   PPT             Open book        10x ea dir 10x ea dir 10x ea dir   LTR    10x ea dir     10x ea dir 10x ea dir   Seated Lumbar Flex w/ PB             Mid Row  Silver TB 3x12 Silver TB 3x12 (standing on foam)     Silver TB 3x12 (standing on foam)  Silver TB 3x12 (standing on foam)   Shld Ext/Lat Pulldown   Black TB 3x12 (standing on foam)     Black TB 3x12 (standing on foam)  Black TB 3x12 (standing on foam)   Shld Ext + March  Blue TB 3x 10 ea LE, alt           Shld Ext AAROM w/ Cane - w/ mild trunk rotation             LE Bike - for lower quarter mobility; endurance L2 8' L2 8' L2 8' L1 10'    L2 8' L2 8' L2 8'   Clamshell in s/l   4# 2x10 ea LE          Standing Heel Raises 3x10       3x10  3x10   Standing Hip Ext 3# 3x10 ea LE 4# 3x10 ea LE (Standing Off Step) 4# 2x10 ea LE (Standing Off Step)     3# 2x10 ea LE  3# 2x10 ea LE   Standing Hip ABD 3# 3x10 ea LE 4# 3x10 ea LE (Standing Off Step)      3# 2x10 ea LE  3# 2x10 ea LE   Shld horizontal ABD  Green TB 2x10         " "  Standing PND D2 Shld Flex  Green TB 2x10 ea UE           Piriformis Stretch  2x30\"           Trunk Rotation - Golf swing stretch    10x ea dir                                   Reverse clamshell in s/l                          Anatomy as it relates to pt's symptoms/presentation         KS    Address pt's questions PRN  KS KS KS    KS KS    HEP KS KS KS KS    KS KS    Update on pt's status/symptoms KS KS KS KS    KS KS                 Ther Activity             FOTO Questionnaire    KS     KS    MA: Update on pt's symptoms/status/function    KS     KS    MA: Objective assessment    KS     KS                 Gait Training                                       Modalities                                              "

## 2024-06-20 ENCOUNTER — OFFICE VISIT (OUTPATIENT)
Dept: PHYSICAL THERAPY | Facility: CLINIC | Age: 79
End: 2024-06-20
Payer: MEDICARE

## 2024-06-20 DIAGNOSIS — G62.9 NEUROPATHY: ICD-10-CM

## 2024-06-20 DIAGNOSIS — G89.29 CHRONIC BILATERAL LOW BACK PAIN WITHOUT SCIATICA: Primary | ICD-10-CM

## 2024-06-20 DIAGNOSIS — I10 HYPERTENSION, UNSPECIFIED TYPE: ICD-10-CM

## 2024-06-20 DIAGNOSIS — M54.50 CHRONIC BILATERAL LOW BACK PAIN WITHOUT SCIATICA: Primary | ICD-10-CM

## 2024-06-20 PROCEDURE — 97110 THERAPEUTIC EXERCISES: CPT

## 2024-06-20 PROCEDURE — 97112 NEUROMUSCULAR REEDUCATION: CPT

## 2024-06-20 PROCEDURE — 97140 MANUAL THERAPY 1/> REGIONS: CPT

## 2024-06-20 NOTE — PROGRESS NOTES
"Daily Note     Today's date: 2024  Patient name: Kevin Reid  : 1945  MRN: [id number]  Referring provider: Quirino Ott MD  Dx:   Encounter Diagnosis     ICD-10-CM    1. Chronic bilateral low back pain without sciatica  M54.50     G89.29       2. Hypertension, unspecified type  I10       3. Neuropathy  G62.9           Start Time: 847  Stop Time: 930  Total time in clinic (min): 43 minutes    Subjective: The pt reports an increase in back pain with symptoms down both legs on Monday once he got up out of bed. Symptoms progressively improved over the course of the day and into the following day or two (Wednesday). Aching pain in his lower back \"never seems to go away.\" Symptoms down his legs are occasional, but daily. -- Pt scheduled an appointment for injections in his R/L SI joints on 2024.      Objective: See treatment diary below      Assessment: The pt participated in a skilled physical therapy session that focused on manual intervention, neuromuscular re-education, and therapeutic exercise. While performing Palloff press with anti-rotation the right, the pt presented with a progressive increase in left-sided LBP and radicular symptoms into the LLE. Symptoms reflect strength deficits within thoracolumbar musculature on the left side of the trunk as well as neural tension/hypersensitivity. He tolerated treatment well. The pt would benefit from continued PT to address impairments in order to improve his quality of life and return to his PLOF without limitations due to pain.      Plan: Continue per plan of care.      Precautions: Neuropathy; HTN; Lymes Disease; PMH includes LLE Total Hip Replacement, RLE/LLE total knee replacements --- 2019: Laminectomy C3; Laminectomy L3 and Fusion L4-L5          Manuals             STM/MFR KS (lumbar; posterior pelvis) KS (lumbar; posterior pelvis) KS (lumbar; posterior  KS (lumbar; posterior  KS (lumbar; " "posterior    KS KS (lumbar; posterior pelvis) KS (lumbar; posterior pelvis)   Lumbar PA KS (+ SIJ) KS (+ SIJ) KS (+ SIJ) KS (+ SIJ) KS (+ SIJ)   KS KS ELIF SIJ                             Neuro Re-Ed             Sciatic Nerve Glide - Supine             TA Activation/Core brace             TA + Iso Hip ADD             TA + Bent Knee Fallout Green TB 2x10 w/ 3\" hold         2x10   Hook-lying Core Brace + March             Core Brace + SLR   1.5# 2x10 ea LE  1.5# 3x10 ea LE     2x10 ea   Paloff Press   Blue TB 2x10 w/ 3\" hold  Green TB 2x10 w/ 3\" hold ea dir.   Green TB 2x10 w/ 5\" hold ea dir.  Green TB 2x10 w/ 5\" hold ea dir.   Sciatic nerve glide seated     10x ea LE                     Ther Ex             Bent Knee Fallout          10 ea   PPT             Open book        10x ea dir 10x ea dir 10x ea dir   LTR    10x ea dir     10x ea dir 10x ea dir   Seated Lumbar Flex w/ PB             Mid Row  Silver TB 3x12 Silver TB 3x12 (standing on foam)  Silver TB 3x12 (standing on foam)   Silver TB 3x12 (standing on foam)  Silver TB 3x12 (standing on foam)   Shld Ext/Lat Pulldown   Black TB 3x12 (standing on foam)  Black TB 3x12 (standing on foam)   Black TB 3x12 (standing on foam)  Black TB 3x12 (standing on foam)   Shld Ext + March  Blue TB 3x 10 ea LE, alt           Shld Ext AAROM w/ Cane - w/ mild trunk rotation             LE Bike - for lower quarter mobility; endurance L2 8' L2 8' L2 8' L1 10' L1 10'   L2 8' L2 8' L2 8'   Clamshell in s/l   4# 2x10 ea LE          Standing Heel Raises 3x10       3x10  3x10   Standing Hip Ext 3# 3x10 ea LE 4# 3x10 ea LE (Standing Off Step) 4# 2x10 ea LE (Standing Off Step)     3# 2x10 ea LE  3# 2x10 ea LE   Standing Hip ABD 3# 3x10 ea LE 4# 3x10 ea LE (Standing Off Step)      3# 2x10 ea LE  3# 2x10 ea LE   Shld horizontal ABD  Green TB 2x10           Standing PND D2 Shld Flex  Green TB 2x10 ea UE           Piriformis Stretch  2x30\"           Trunk Rotation - Golf swing stretch    10x ea " dir                                   Reverse clamshell in s/l                          Anatomy as it relates to pt's symptoms/presentation         KS    Address pt's questions PRN  KS KS KS    KS KS    HEP KS KS KS KS    KS KS    Update on pt's status/symptoms KS KS KS KS KS   KS KS                 Ther Activity             FOTO Questionnaire    KS     KS    MA: Update on pt's symptoms/status/function    KS     KS    MA: Objective assessment    KS     KS                 Gait Training                                       Modalities

## 2024-06-27 ENCOUNTER — OFFICE VISIT (OUTPATIENT)
Dept: PHYSICAL THERAPY | Facility: CLINIC | Age: 79
End: 2024-06-27
Payer: MEDICARE

## 2024-06-27 DIAGNOSIS — I10 HYPERTENSION, UNSPECIFIED TYPE: ICD-10-CM

## 2024-06-27 DIAGNOSIS — M54.50 CHRONIC BILATERAL LOW BACK PAIN WITHOUT SCIATICA: Primary | ICD-10-CM

## 2024-06-27 DIAGNOSIS — G89.29 CHRONIC BILATERAL LOW BACK PAIN WITHOUT SCIATICA: Primary | ICD-10-CM

## 2024-06-27 DIAGNOSIS — G62.9 NEUROPATHY: ICD-10-CM

## 2024-06-27 PROCEDURE — 97110 THERAPEUTIC EXERCISES: CPT

## 2024-06-27 PROCEDURE — 97140 MANUAL THERAPY 1/> REGIONS: CPT

## 2024-06-27 NOTE — PROGRESS NOTES
"Daily Note     Today's date: 2024  Patient name: Kevin Reid  : 1945  MRN: [id number]  Referring provider: Quirino Ott MD  Dx:   Encounter Diagnosis     ICD-10-CM    1. Chronic bilateral low back pain without sciatica  M54.50     G89.29       2. Hypertension, unspecified type  I10       3. Neuropathy  G62.9           Start Time: 847  Stop Time: 928  Total time in clinic (min): 41 minutes    Subjective: Pt reports that he has been feeling sore and \"really achy.\" -- Pt had an appointment with a back specialist on Monday, . He is scheduled to get 2 injections (one in each SIJ) on 2024.      Objective: See treatment diary below      Assessment: The pt participated in a skilled physical therapy session that focused on manual intervention, neuromuscular re-education, and therapeutic exercise. In response to the pt's presentation of increased stiffness, there was a greater emphasis placed on mobility and flexibility. He presented with tenderness in R low back/SIJ at end ROM with LTR to the left, which progressively decreased with repetition. PPT w/ core press and physioball press was added to address impairments in neuromuscular control/strength in lumbopelvic and core musculature. He tolerated treatment well. The pt would benefit from continued PT to address impairments in order to improve his quality of life and return to his PLOF without limitations due to pain.       Plan: Continue per plan of care.      Precautions: Neuropathy; HTN; Lymes Disease; PMH includes LLE Total Hip Replacement, RLE/LLE total knee replacements --- 2019: Laminectomy C3; Laminectomy L3 and Fusion L4-L5          Manuals             STM/MFR KS (lumbar; posterior pelvis) KS (lumbar; posterior pelvis) KS (lumbar; posterior  KS (lumbar; posterior  KS (lumbar; posterior  KS (lumbar; posterior pelvis)    KS (lumbar; posterior pelvis)   Lumbar PA KS (+ SIJ) KS (+ SIJ) KS (+ SIJ) KS (+ " "SIJ) KS (+ SIJ) KS (+ SIJ)    ELIF SIJ                             Neuro Re-Ed             Sciatic Nerve Glide - Supine             TA Activation/Core brace             TA + Iso Hip ADD             TA + Bent Knee Fallout Green TB 2x10 w/ 3\" hold         2x10   Hook-lying Core Brace + March             PPT + Core Brace w/ Physioball press - Hook-lying      2x10 w/ 5\" hold       Core Brace + SLR   1.5# 2x10 ea LE  1.5# 3x10 ea LE     2x10 ea   Paloff Press   Blue TB 2x10 w/ 3\" hold  Green TB 2x10 w/ 3\" hold ea dir.     Green TB 2x10 w/ 5\" hold ea dir.   Sciatic nerve glide seated     10x ea LE                     Ther Ex             Bent Knee Fallout          10 ea   PPT             Open book      15x w/ 3\" hold ea dir    10x ea dir   LTR    10x ea dir  15x w/ 3\" hold ea dir    10x ea dir   Seated Lumbar Flex w/ PB             Mid Row  Silver TB 3x12 Silver TB 3x12 (standing on foam)  Silver TB 3x12 (standing on foam)     Silver TB 3x12 (standing on foam)   Shld Ext/Lat Pulldown   Black TB 3x12 (standing on foam)  Black TB 3x12 (standing on foam)     Black TB 3x12 (standing on foam)   Shld Ext + March  Blue TB 3x 10 ea LE, alt           Shld Ext AAROM w/ Cane - w/ mild trunk rotation             LE Bike - for lower quarter mobility; endurance L2 8' L2 8' L2 8' L1 10' L1 10' L1 10'    L2 8'   Clamshell in s/l   4# 2x10 ea LE          Standing Heel Raises 3x10         3x10   Standing Hip Ext 3# 3x10 ea LE 4# 3x10 ea LE (Standing Off Step) 4# 2x10 ea LE (Standing Off Step)       3# 2x10 ea LE   Standing Hip ABD 3# 3x10 ea LE 4# 3x10 ea LE (Standing Off Step)        3# 2x10 ea LE   Shld horizontal ABD  Green TB 2x10           Standing PND D2 Shld Flex  Green TB 2x10 ea UE           Piriformis Stretch  2x30\"           Trunk Rotation - Golf swing stretch    10x ea dir                                   Reverse clamshell in s/l                          Anatomy as it relates to pt's symptoms/presentation             Address pt's " questions PRN  KS KS KS  KS       HEP KS KS KS KS         Update on pt's status/symptoms KS KS KS KS KS KS                    Ther Activity             FOTO Questionnaire    KS         KY: Update on pt's symptoms/status/function    KS         KY: Objective assessment    KS                      Gait Training                                       Modalities

## 2024-07-18 ENCOUNTER — OFFICE VISIT (OUTPATIENT)
Dept: PHYSICAL THERAPY | Facility: CLINIC | Age: 79
End: 2024-07-18
Payer: MEDICARE

## 2024-07-18 DIAGNOSIS — G89.29 CHRONIC BILATERAL LOW BACK PAIN WITHOUT SCIATICA: Primary | ICD-10-CM

## 2024-07-18 DIAGNOSIS — M54.50 CHRONIC BILATERAL LOW BACK PAIN WITHOUT SCIATICA: Primary | ICD-10-CM

## 2024-07-18 DIAGNOSIS — I10 HYPERTENSION, UNSPECIFIED TYPE: ICD-10-CM

## 2024-07-18 DIAGNOSIS — G62.9 NEUROPATHY: ICD-10-CM

## 2024-07-18 PROCEDURE — 97110 THERAPEUTIC EXERCISES: CPT

## 2024-07-18 PROCEDURE — 97530 THERAPEUTIC ACTIVITIES: CPT

## 2024-07-18 PROCEDURE — 97140 MANUAL THERAPY 1/> REGIONS: CPT

## 2024-07-18 NOTE — PROGRESS NOTES
PT Progress Report/Update POC    Today's date: 2024  Patient name: Kevin Reid  : 1945  MRN: [id number]  Referring provider: Quirino Ott MD  Dx:   Encounter Diagnosis     ICD-10-CM    1. Chronic bilateral low back pain without sciatica  M54.50     G89.29       2. Hypertension, unspecified type  I10       3. Neuropathy  G62.9                 Start Time: 0848  Stop Time: 930  Total time in clinic (min): 42 minutes    Assessment  Impairments: abnormal or restricted ROM, activity intolerance, impaired physical strength, pain with function, poor posture  and poor body mechanics  Symptom irritability: moderate    Assessment details: The pt is a 79 year old male who presents to skilled physical therapy services due to a decline in functional status related to an onset of acute on chronic LBP associated with lifting/carrying [place] tree in January. At this point in his POC, the pt has received 17 skilled physical therapy sessions. Upon completion of the OH, the pt presents with improvements in symptom severity/frequency, lumbar mobility (particularly extension), willingness to move, LE strength, and positional tolerance as noted by subjective report and objective measures. An 11 point increase in his FOTO Functional Status measure (IE: 54; OH: 65) further reflects progress in his functional mobility and activity capacity. He continues to present with impairments in pain, thoracolumbar mobility, flexibility, core/abdominal strength (diastasis recti - doming), neural sensation (PMH of neuropathy), and LE strength. As a result of these impairments, the pt has difficulty with the following functional activities: bending, lifting, carrying, and recreational activities (e.g., golf, boat maintenance, gardening). POC will include manual therapy for flexibility/mobility and symptom modulation, modalities (PRN), TE/TA/NR interventions for functional strength and neuromuscular control, HEP, and pt  education. The pt will continue to benefit from skilled physical therapy services to address impairments in order to return to his PLOF without limitations due to pain. Considering the chronicity of the pt's symptoms as well as his PMH (e.g., laminectomy/fusion lumbar spine), progress is slower and he will benefit from an extended POC when compared to a pt who is less medically complex.    Thank you for the referral.    Barriers to therapy: Neuropathy; HTN; Lymes Disease; PMH includes LLE Total Hip Replacement, RLE/LLE total knee replacements --- 2019: Laminectomy C3; Laminectomy L3 and Fusion L4-L5  Understanding of Dx/Px/POC: good     Prognosis: good    Goals  STGs: Achieve within 5 weeks  1. Increase functional LE strength to at least 4+/5 for each major muscle group in order to reflect improved functional strength for ADLs/IADLs.   (MET)  2. Decrease pain to no greater than 4/10 on the NPRS in order to reflect improved activity capacity/tolerance.    (PROGRESSING)  3. Independent with HEP to aid in continued progress between treatment sessions.   (MET)  4. Increase FOTO Functional Status measure by at least 5 points in order to reflect improvements in functional status and activity capacity.    (MET)    LTGs: Achieve by discharge  1. Decrease pain to no greater than 2/10 on the NPRS in order to reflect improved activity capacity/tolerance.   (PROGRESSING)  2. Progress to at least 90% return to PLOF per pt report in order to reflect improvements in functional status and activity capacity.       (PROGRESSING)  3. Increase FOTO Functional Status measure score to at least benchmark score.    (MET)  4. Independent with HEP for long-term management of symptoms and functional mobility.    (ONGOING)      Plan  Patient would benefit from: skilled physical therapy  Referral necessary: Yes  Planned modality interventions: thermotherapy: hydrocollator packs    Planned therapy interventions: abdominal trunk stabilization,  "joint mobilization, manual therapy, balance/weight bearing training, body mechanics training, neuromuscular re-education, patient education, postural training, flexibility, strengthening, stretching, functional ROM exercises, therapeutic activities, therapeutic exercise and home exercise program    Frequency: 1x week  Plan of Care beginning date: 2/29/2024  Plan of Care expiration date: 8/16/2024  Treatment plan discussed with: patient  Plan details: 7/18/2024: Request extension of current POC at a frequency of 1x/wk to address remaining impairments -- Pt will be on vacation out of state during end of July/early August and will need temporary hold of POC. Plan to reassess pt status upon return from vacation.        Subjective Evaluation    History of Present Illness  Date of onset: 1/15/2024  Mechanism of injury:   PROGRESS REPORT: 7/18/2024  Pt reports that his back is feeling \"better.\" He received injections at both SIJ on 7/8/2024. Pt notes that he notices it the most in the morning with a decrease in pain. -- Improved standing tolerance. Pt notes able to stand longer than 30 minutes without issue (e.g., shopping). -- Pt reports that it is easier to manage stairs and to get out of bed.      PROGRESS REPORT: 6/13/2024  The pt reports a change and increase in his low back pain over the past two weeks. He explains that he has pain across his lower back on both sides with symptoms radiating to the sides of his hips/pelvis and into the L thigh. He is unable to recall a particular event/injury that would have caused the change in his symptoms. -- He notes that he plans to reach out to one of his prior physicians for an assessment and to discuss injections (previous treatment). -- Pt reports 4/10-5/10 pain upon arrival.     PROGRESS REPORT: 5/2/2024  \"I tweaked my back this weekend.\" Pt explains that he was polishing his boat. He notes significant pain in the right side of of his low back and an on onset of aching pain " "down the LLE. Pt notes that he was out working on the boat for about 3 hours. Pt has used heat, ice, and Tylenol to help mange pain. -- Pt has not played golf recently (personal goal). -- Pt reports that he is avoiding lifting heavier items with concerns that he may hurt his back.      PROGRESS REPORT: 3/28/2024  Pt reports low back pain on right side. Pt denies tingling/numbness down LE. -- Pt reports that he is going to [employer] every 3rd day (better than every other day to allow for rest). Pt has some pain with elliptical, but no pain with other exercises. -- Back pain does not wake him up at night.    INITIAL EVALUATION:  Pt reports symptoms of chronic LBP with a recent increase in pain over the past 4-6 weeks after carrying/lifting [place] tree out of the house. -- Pt reports a history of LBP (R side > L side).      Chronic LE knee pain (L > R) pain, particularly with going up stairs. Pt denies that the knee give out and notes that his knee feel stable.    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 (no medical plans); Multiple steroid injections for low back; Ablation ( - \"that did not do anything at all\")          Recurrent probem    Patient Goals  Patient goals for therapy: decreased pain, increased motion, independence with ADLs/IADLs and return to sport/leisure activities  Patient goal: Golf (currently can tolerate 9 holes)  Pain  Current pain rating: 3  At best pain ratin  At worst pain ratin (1 day over the past week \"it was high.\" Pt notes that his symptoms were better the next day.)  Location: lower back - bilateral  Quality: dull ache and radiating  Relieving factors: relaxation, rest and change in position (Cat-cow; HEP)  Aggravating factors: lifting (Lifting/carrying fire wood (avoiding due to concerns for pain); Bending/lifting (e.g., gardening))  Progression: improved    Social Support  Steps to enter house: yes (Front: 1; Back: 13)  Stairs in house: yes (2 sets: 13 steps) "   Lives in: multiple-level home  Lives with: spouse    Employment status: not working ()  Treatments  Previous treatment: injection treatment and medication  Current treatment: injection treatment, medication and physical therapy  Current treatment comments: Injection in left shoulder within past year; B/L SIJ 7/8/2024.         Objective     Concurrent Complaints  Negative for night pain, disturbed sleep, bladder dysfunction and bowel dysfunction    Static Posture     Head  Forward.    Shoulders  Depressed and rounded.    Thoracic Spine  Hyperkyphosis.    Lumbar Spine   Flattened and decreased lordosis.     Postural Observations  Seated posture: fair  Standing posture: fair      Palpation     Right   Tenderness of the erector spinae, lumbar interspinals, lumbar paraspinals and quadratus lumborum.     Tenderness     Lumbar Spine  No tenderness in the spinous process.     Right Hip   Tenderness in the PSIS and sacroiliac joint.     Neurological Testing     Sensation     Lumbar   Left   Intact: light touch  Diminished: light touch    Right   Intact: light touch    Comments   Left light touch: Diminished along medial lower leg    Active Range of Motion     Lumbar   Flexion:  Restriction level: moderate  Extension:  WFL and with pain  Left lateral flexion: Active left lumbar lateral flexion: Right side thoracolumbar pain.    with pain Restriction level: moderate  Right lateral flexion:  Restriction level: moderate  Left rotation:  with pain Restriction level: minimal  Right rotation:  Restriction level: minimal    Joint Play     Hypomobile: L1, L2, L3, L4, L5 and S1     Strength/Myotome Testing     Left Hip   Planes of Motion   Flexion: 4+ (assessed seated)    Right Hip   Planes of Motion   Flexion: 4+ (Assessed seated)    Left Knee   Flexion: 4+  Extension: 5    Right Knee   Flexion: 4+  Extension: 5    Left Ankle/Foot   Dorsiflexion: 5    Right Ankle/Foot   Dorsiflexion: 5    Tests     Lumbar  "    Left   Positive slump test (End range w/ chin to chest and forward flexion).     Right   Positive slump test (End range w/ chin to chest and forward flexion; R>L).     Left Hip   Negative NIKKI and FADIR.     Right Hip   Negative NIKKI and FADIR.     Ambulation   Weight-Bearing Status   Weight-Bearing Status (Left): full weight bearing   Weight-Bearing Status (Right): full weight-bearing    Assistive device used: none    Quality of Movement During Gait     Additional Quality of Movement During Gait Details  Limited trunk rotation during ambulation               Precautions: Neuropathy; HTN; Lymes Disease; PMH includes LLE Total Hip Replacement, RLE/LLE total knee replacements --- 2019: Laminectomy C3; Laminectomy L3 and Fusion L4-L5       5/16 5/23 5/30 6/13 6/20 6/27 7/18 5/9   Manuals             STM/MFR KS (lumbar; posterior pelvis) KS (lumbar; posterior pelvis) KS (lumbar; posterior  KS (lumbar; posterior  KS (lumbar; posterior  KS (lumbar; posterior pelvis) KS (lumbar; posterior pelvis)   KS (lumbar; posterior pelvis)   Lumbar PA KS (+ SIJ) KS (+ SIJ) KS (+ SIJ) KS (+ SIJ) KS (+ SIJ) KS (+ SIJ) KS (+ SIJ)   ELIF SIJ                             Neuro Re-Ed             Sciatic Nerve Glide - Supine             TA Activation/Core brace             TA + Iso Hip ADD             TA + Bent Knee Fallout Green TB 2x10 w/ 3\" hold         2x10   Hook-lying Core Brace + March             PPT + Core Brace w/ Physioball press - Hook-lying      2x10 w/ 5\" hold       Core Brace + SLR   1.5# 2x10 ea LE  1.5# 3x10 ea LE     2x10 ea   Paloff Press   Blue TB 2x10 w/ 3\" hold  Green TB 2x10 w/ 3\" hold ea dir.     Green TB 2x10 w/ 5\" hold ea dir.   Sciatic nerve glide seated     10x ea LE                     Ther Ex             Bent Knee Fallout          10 ea   PPT             Open book      15x w/ 3\" hold ea dir    10x ea dir   LTR    10x ea dir  15x w/ 3\" hold ea dir    10x ea dir   Seated Lumbar Flex w/ PB             Mid Row  " "Silver TB 3x12 Silver TB 3x12 (standing on foam)  Silver TB 3x12 (standing on foam)     Silver TB 3x12 (standing on foam)   Shld Ext/Lat Pulldown   Black TB 3x12 (standing on foam)  Black TB 3x12 (standing on foam)     Black TB 3x12 (standing on foam)   Shld Ext + March  Blue TB 3x 10 ea LE, alt           Shld Ext AAROM w/ Cane - w/ mild trunk rotation             LE Bike - for lower quarter mobility; endurance L2 8' L2 8' L2 8' L1 10' L1 10' L1 10' L2 10'   L2 8'   Clamshell in s/l   4# 2x10 ea LE          Standing Heel Raises 3x10         3x10   Standing Hip Ext 3# 3x10 ea LE 4# 3x10 ea LE (Standing Off Step) 4# 2x10 ea LE (Standing Off Step)       3# 2x10 ea LE   Standing Hip ABD 3# 3x10 ea LE 4# 3x10 ea LE (Standing Off Step)        3# 2x10 ea LE   Shld horizontal ABD  Green TB 2x10           Standing PND D2 Shld Flex  Green TB 2x10 ea UE           Piriformis Stretch  2x30\"           Trunk Rotation - Golf swing stretch    10x ea dir                                   Reverse clamshell in s/l                          Anatomy as it relates to pt's symptoms/presentation             Address pt's questions PRN  KS KS KS  KS KS      HEP KS KS KS KS         Update on pt's status/symptoms KS KS KS KS KS KS KS                   Ther Activity             FOTO Questionnaire    KS   KS      NM: Update on pt's symptoms/status/function    KS   KS      NM: Objective assessment    KS   KS                   Gait Training                                       Modalities                                              "

## 2024-07-24 NOTE — PROGRESS NOTES
"Daily Note     Today's date: 2024  Patient name: Kevin Reid  : 1945  MRN: [id number]  Referring provider: Quirino Ott MD  Dx:   Encounter Diagnosis     ICD-10-CM    1. Chronic bilateral low back pain without sciatica  M54.50     G89.29       2. Hypertension, unspecified type  I10       3. Neuropathy  G62.9           Start Time: 801  Stop Time: 845  Total time in clinic (min): 44 minutes    Subjective: \"The shots are still working.\"      Objective: See treatment diary below      Assessment: The pt participated in a skilled physical therapy session that focused on manual intervention, neuromuscular re-education, and therapeutic exercise. Static balance interventions were introduced to address impairments in stability partly associated with PMH of neuropathy. He presented with significant unsteadiness on his feet as noted by multidirectional sway. CG assistance is required for pt safety. He tolerated treatment well. The pt would benefit from continued PT to address impairments in order to return to his PLOF without limitations due to pain.       Plan: Continue per plan of care.      Precautions: Neuropathy; HTN; Lymes Disease; PMH includes LLE Total Hip Replacement, RLE/LLE total knee replacements --- 2019: Laminectomy C3; Laminectomy L3 and Fusion L4-L5            Manuals             STM/MFR KS (lumbar; posterior pelvis) KS (lumbar; posterior pelvis) KS (lumbar; posterior  KS (lumbar; posterior  KS (lumbar; posterior  KS (lumbar; posterior pelvis) KS (lumbar; posterior pelvis) KS (lumbar; posterior pelvis)     Lumbar PA KS (+ SIJ) KS (+ SIJ) KS (+ SIJ) KS (+ SIJ) KS (+ SIJ) KS (+ SIJ) KS (+ SIJ) KS (+ SIJ)                               Neuro Re-Ed             Sciatic Nerve Glide - Supine             TA Activation/Core brace             TA + Iso Hip ADD             TA + Bent Knee Fallout Green TB 2x10 w/ 3\" hold            Hook-lying Core Brace + March   " "          PPT + Core Brace w/ Physioball press - Hook-lying      2x10 w/ 5\" hold       Core Brace + SLR   1.5# 2x10 ea LE  1.5# 3x10 ea LE        Paloff Press   Blue TB 2x10 w/ 3\" hold  Green TB 2x10 w/ 3\" hold ea dir.        Sciatic nerve glide seated     10x ea LE        Standard KISHORE: Foam        1x45\" EO/EC/HTs     Narrow KISHORE: Foam         1x45\" EO/EC/HTs                  Ther Ex             Bent Knee Fallout             PPT             Open book      15x w/ 3\" hold ea dir       LTR    10x ea dir  15x w/ 3\" hold ea dir       Seated Lumbar Flex w/ PB             Mid Row  Silver TB 3x12 Silver TB 3x12 (standing on foam)  Silver TB 3x12 (standing on foam)        Shld Ext/Lat Pulldown   Black TB 3x12 (standing on foam)  Black TB 3x12 (standing on foam)        Shld Ext + March  Blue TB 3x 10 ea LE, alt           Shld Ext AAROM w/ Cane - w/ mild trunk rotation             LE Bike - for lower quarter mobility; endurance L2 8' L2 8' L2 8' L1 10' L1 10' L1 10' L2 10' L2 10'     Clamshell in s/l   4# 2x10 ea LE          Standing Heel Raises 3x10            Standing Hip Ext 3# 3x10 ea LE 4# 3x10 ea LE (Standing Off Step) 4# 2x10 ea LE (Standing Off Step)     4# 3x10 ea LE (Standing Off Step)     Standing Hip ABD 3# 3x10 ea LE 4# 3x10 ea LE (Standing Off Step)      4# 3x10 ea LE (Standing Off Step)     Shld horizontal ABD  Green TB 2x10           Standing PND D2 Shld Flex  Green TB 2x10 ea UE           Piriformis Stretch  2x30\"           Trunk Rotation - Golf swing stretch    10x ea dir                                   Reverse clamshell in s/l                          Anatomy as it relates to pt's symptoms/presentation        KS     Address pt's questions PRN  KS KS KS  KS KS KS     HEP KS KS KS KS         Update on pt's status/symptoms KS KS KS KS KS KS KS KS                  Ther Activity             FOTO Questionnaire    KS   KS      ND: Update on pt's symptoms/status/function    KS   KS      ND: Objective assessment    " WILLIAMS KRAMER                   Gait Training                                       Modalities

## 2024-07-25 ENCOUNTER — OFFICE VISIT (OUTPATIENT)
Dept: PHYSICAL THERAPY | Facility: CLINIC | Age: 79
End: 2024-07-25
Payer: MEDICARE

## 2024-07-25 DIAGNOSIS — I10 HYPERTENSION, UNSPECIFIED TYPE: ICD-10-CM

## 2024-07-25 DIAGNOSIS — G62.9 NEUROPATHY: ICD-10-CM

## 2024-07-25 DIAGNOSIS — G89.29 CHRONIC BILATERAL LOW BACK PAIN WITHOUT SCIATICA: Primary | ICD-10-CM

## 2024-07-25 DIAGNOSIS — M54.50 CHRONIC BILATERAL LOW BACK PAIN WITHOUT SCIATICA: Primary | ICD-10-CM

## 2024-07-25 PROCEDURE — 97110 THERAPEUTIC EXERCISES: CPT

## 2024-07-25 PROCEDURE — 97112 NEUROMUSCULAR REEDUCATION: CPT

## 2024-07-30 ENCOUNTER — APPOINTMENT (OUTPATIENT)
Dept: PHYSICAL THERAPY | Facility: CLINIC | Age: 79
End: 2024-07-30
Payer: MEDICARE

## 2024-08-15 ENCOUNTER — OFFICE VISIT (OUTPATIENT)
Dept: PHYSICAL THERAPY | Facility: CLINIC | Age: 79
End: 2024-08-15
Payer: MEDICARE

## 2024-08-15 DIAGNOSIS — G89.29 CHRONIC BILATERAL LOW BACK PAIN WITHOUT SCIATICA: Primary | ICD-10-CM

## 2024-08-15 DIAGNOSIS — M54.50 CHRONIC BILATERAL LOW BACK PAIN WITHOUT SCIATICA: Primary | ICD-10-CM

## 2024-08-15 DIAGNOSIS — G62.9 NEUROPATHY: ICD-10-CM

## 2024-08-15 DIAGNOSIS — I10 HYPERTENSION, UNSPECIFIED TYPE: ICD-10-CM

## 2024-08-15 PROCEDURE — 97140 MANUAL THERAPY 1/> REGIONS: CPT

## 2024-08-15 PROCEDURE — 97112 NEUROMUSCULAR REEDUCATION: CPT

## 2024-08-15 PROCEDURE — 97110 THERAPEUTIC EXERCISES: CPT

## 2024-08-15 NOTE — PROGRESS NOTES
"Daily Note     Today's date: 8/15/2024  Patient name: Kevin Reid  : 1945  MRN: [id number]  Referring provider: Quirino Ott MD  Dx:   Encounter Diagnosis     ICD-10-CM    1. Chronic bilateral low back pain without sciatica  M54.50     G89.29       2. Hypertension, unspecified type  I10       3. Neuropathy  G62.9           Start Time: 801  Stop Time: 842  Total time in clinic (min): 41 minutes    Subjective: The pt reports that he thinks he \"tweaked\" his back while on his fishing trip during that past one-two weeks. He explains that there was a lot of moving (e.g., starting the engine of the boat) and lifting/carrying luggage. He reports a return of back pain on 2024 (day before returned from trip). Symptoms of back pain on both sides of low back. Pt denies LE radicular symptoms. -- He did not have his CPAP with him during the trip and he did not do exercises while on the trip, but has returned to doing them over the past few days. -- Follow-up appointment with [facility] office next week.       Objective: See treatment diary below      Assessment: The pt participated in a skilled physical therapy session that focused on manual intervention, therapeutic exercise, and neuromuscular re-education. The pt's program was not progressed as he was away on vacation, presents with an acute increase in LBP, and is recently returning to his exercise routine. While performing the static balance interventions, he required CG assistance to improve pt safety. He tolerated treatment well. The pt would benefit from continued PT to address impairments in order to return to his PLOF without limitations due to pain.       Plan: Continue per plan of care.      Precautions: Neuropathy; HTN; Lymes Disease; PMH includes LLE Total Hip Replacement, RLE/LLE total knee replacements --- 2019: Laminectomy C3; Laminectomy L3 and Fusion L4-L5       5/16 5/23 5/30 6/13 6/20 6/27 7/18 7/25 8/15    Manuals             STM/MFR KS " "(lumbar; posterior pelvis) KS (lumbar; posterior pelvis) KS (lumbar; posterior  KS (lumbar; posterior  KS (lumbar; posterior  KS (lumbar; posterior pelvis) KS (lumbar; posterior pelvis) KS (lumbar; posterior pelvis) KS (lumbar; posterior pelvis)    Lumbar PA KS (+ SIJ) KS (+ SIJ) KS (+ SIJ) KS (+ SIJ) KS (+ SIJ) KS (+ SIJ) KS (+ SIJ) KS (+ SIJ) KS (+ SIJ)                              Neuro Re-Ed             Sciatic Nerve Glide - Supine             TA Activation/Core brace             TA + Iso Hip ADD             TA + Bent Knee Fallout Green TB 2x10 w/ 3\" hold            Hook-lying Core Brace + March             PPT + Core Brace w/ Physioball press - Hook-lying      2x10 w/ 5\" hold       Core Brace + SLR   1.5# 2x10 ea LE  1.5# 3x10 ea LE        Paloff Press   Blue TB 2x10 w/ 3\" hold  Green TB 2x10 w/ 3\" hold ea dir.        Sciatic nerve glide seated     10x ea LE        Standard KISHORE: Foam        1x45\" EO/EC/HTs 1x45\" EO/EC/HTs    Narrow KISHORE: Foam         1x45\" EO/EC/HTs 1x45\" EO/EC/HTs                 Ther Ex             Bent Knee Fallout             PPT             Open book      15x w/ 3\" hold ea dir   15x w/ 3\" hold ea dir    LTR    10x ea dir  15x w/ 3\" hold ea dir   15x w/ 3\" hold ea dir    Seated Lumbar Flex w/ PB             Mid Row  Silver TB 3x12 Silver TB 3x12 (standing on foam)  Silver TB 3x12 (standing on foam)        Shld Ext/Lat Pulldown   Black TB 3x12 (standing on foam)  Black TB 3x12 (standing on foam)        Shld Ext + March  Blue TB 3x 10 ea LE, alt           Shld Ext AAROM w/ Cane - w/ mild trunk rotation             LE Bike - for lower quarter mobility; endurance L2 8' L2 8' L2 8' L1 10' L1 10' L1 10' L2 10' L2 10' L1 10'    Clamshell in s/l   4# 2x10 ea LE          Standing Heel Raises 3x10            Standing Hip Ext 3# 3x10 ea LE 4# 3x10 ea LE (Standing Off Step) 4# 2x10 ea LE (Standing Off Step)     4# 3x10 ea LE (Standing Off Step)     Standing Hip ABD 3# 3x10 ea LE 4# 3x10 ea LE (Standing " "Off Step)      4# 3x10 ea LE (Standing Off Step)     Shld horizontal ABD  Green TB 2x10           Standing PND D2 Shld Flex  Green TB 2x10 ea UE           Piriformis Stretch  2x30\"           Trunk Rotation - Golf swing stretch    10x ea dir                                   Reverse clamshell in s/l                          Anatomy as it relates to pt's symptoms/presentation        KS     Address pt's questions PRN  KS KS KS  KS KS KS KS    HEP KS KS KS KS         Update on pt's status/symptoms KS KS KS KS KS KS KS KS KS                 Ther Activity             FOTO Questionnaire    KS   KS      IN: Update on pt's symptoms/status/function    KS   KS      IN: Objective assessment    KS   KS                   Gait Training                                       Modalities                                                "

## 2024-08-20 NOTE — PROGRESS NOTES
PT Progress Report/Update POC    Today's date: 2024  Patient name: Kevin Reid  : 1945  MRN: [id number]  Referring provider: Quirino Ott MD  Dx:   Encounter Diagnosis     ICD-10-CM    1. Chronic bilateral low back pain without sciatica  M54.50     G89.29       2. Hypertension, unspecified type  I10       3. Neuropathy  G62.9         Start Time: 0800  Stop Time: 0845  Total time in clinic (min): 45 minutes    Assessment  Impairments: abnormal or restricted ROM, activity intolerance, impaired balance, impaired physical strength, pain with function, poor posture , poor body mechanics, participation limitations, activity limitations and endurance  Irritability comments: low to moderate    Assessment details: The pt is a 79 year old male who presents to skilled physical therapy services due to a decline in functional status related to an onset of acute on chronic LBP associated with lifting/carrying [place] tree in January. At this point in his POC, the pt has received 20 skilled physical therapy sessions. Upon completion of the [place], the pt presents with improvements in symptom severity/frequency, willingness to move, neural tension/hypersensitivity, LE strength, and positional tolerance as noted by subjective report and objective measures. An 15 point increase in his FOTO Functional Status measure (IE: 54; WY: 69) further reflects progress in his functional mobility and activity capacity. He continues to present with impairments in pain, thoracolumbar mobility, flexibility, core/abdominal strength (diastasis recti - doming), static/dynamic balance (PMH neuropathy; balance assessment), and LE strength. As a result of these impairments, the pt has difficulty with the following functional activities: bending, lifting, carrying, and recreational activities (e.g., golf, boat maintenance). POC will include manual therapy for flexibility/mobility and symptom modulation, modalities (PRN), TE/TA/NR  interventions for functional strength and neuromuscular control, HEP, and pt education. The pt will continue to benefit from skilled physical therapy services to address impairments in order to return to his PLOF without limitations due to pain. Considering the chronicity of the pt's symptoms as well as his PMH (e.g., laminectomy/fusion lumbar spine), progress is slower and he will benefit from an extended POC when compared to a pt who is less medically complex.    Thank you for the referral.    Barriers to therapy: Neuropathy; HTN; Lymes Disease; PMH includes LLE Total Hip Replacement, RLE/LLE total knee replacements --- 2019: Laminectomy C3; Laminectomy L3 and Fusion L4-L5  Barriers to intervention: medical complexity  Understanding of Dx/Px/POC: good     Prognosis: good    Goals  STGs: Achieve within 5 weeks  1. Increase functional LE strength to at least 4+/5 for each major muscle group in order to reflect improved functional strength for ADLs/IADLs.   (MET)  2. Decrease pain to no greater than 4/10 on the NPRS in order to reflect improved activity capacity/tolerance.    (MET)  3. Independent with HEP to aid in continued progress between treatment sessions.   (MET)  4. Increase FOTO Functional Status measure by at least 5 points in order to reflect improvements in functional status and activity capacity.    (MET)    LTGs: Achieve by discharge  1. Decrease pain to no greater than 2/10 on the NPRS in order to reflect improved activity capacity/tolerance.   (PROGRESSING)  2. Progress to at least 90% return to PLOF per pt report in order to reflect improvements in functional status and activity capacity.       (PROGRESSING)  3. Increase FOTO Functional Status measure score to at least benchmark score.    (MET)  4. Independent with HEP for long-term management of symptoms and functional mobility.    (ONGOING)      Plan  Patient would benefit from: skilled physical therapy  Referral necessary: Yes  Planned modality  "interventions: thermotherapy: hydrocollator packs    Planned therapy interventions: abdominal trunk stabilization, joint mobilization, manual therapy, body mechanics training, neuromuscular re-education, patient education, postural training, flexibility, strengthening, stretching, functional ROM exercises, therapeutic activities, therapeutic exercise, home exercise program and balance    Frequency: 1x week  Plan of Care beginning date: 2/29/2024  Plan of Care expiration date: 10/11/2024  Treatment plan discussed with: patient  Plan details: 8/21/2024: Request 8 week extension to current POC at frequency of 1x/wk to address remaining impairments.    7/18/2024: Request extension of current POC at a frequency of 1x/wk to address remaining impairments -- Pt will be on vacation out of state during end of July/early August and will need temporary hold of POC. Plan to reassess pt status upon return from vacation.        Subjective Evaluation    History of Present Illness  Date of onset: 1/15/2024  Mechanism of injury:   PROGRESS REPORT: 8/21/2024  The pt reports that his symptoms are \"about the same\" as last week. He notes central low back pain on both sides. He denies symptoms down his legs. -- Pt has a follow-up with the physician who performed injections to [facility] later this morning. -- Pt reports he is better able to reach back. -- Pt notes needing to be careful when lifting/moving heavy items (e.g., air conditioner). -- Pt reports decreased use of gabapentin over the past 2 weeks due to concerns about how the medication can effect balance. He notes no change in his neuropathy after dose change. He has not talked to his physician about the change. He plans to discuss this with his physician at his appointment today.    PROGRESS REPORT: 7/18/2024  Pt reports that his back is feeling \"better.\" He received injections at both [facility] on 7/8/2024. Pt notes that he notices it the most in the morning with a decrease in pain. -- " "Improved standing tolerance. Pt notes able to stand longer than 30 minutes without issue (e.g., shopping). -- Pt reports that it is easier to manage stairs and to get out of bed.      PROGRESS REPORT: 6/13/2024  The pt reports a change and increase in his low back pain over the past two weeks. He explains that he has pain across his lower back on both sides with symptoms radiating to the sides of his hips/pelvis and into the L thigh. He is unable to recall a particular event/injury that would have caused the change in his symptoms. -- He notes that he plans to reach out to one of his prior physicians for an assessment and to discuss injections (previous treatment). -- Pt reports 4/10-5/10 pain upon arrival.     PROGRESS REPORT: 5/2/2024  \"I tweaked my back this weekend.\" Pt explains that he was polishing his boat. He notes significant pain in the right side of of his low back and an on onset of aching pain down the LLE. Pt notes that he was out working on the boat for about 3 hours. Pt has used heat, ice, and Tylenol to help mange pain. -- Pt has not played golf recently (personal goal). -- Pt reports that he is avoiding lifting heavier items with concerns that he may hurt his back.      PROGRESS REPORT: 3/28/2024  Pt reports low back pain on right side. Pt denies tingling/numbness down LE. -- Pt reports that he is going to [employer] every 3rd day (better than every other day to allow for rest). Pt has some pain with elliptical, but no pain with other exercises. -- Back pain does not wake him up at night.    INITIAL EVALUATION:  Pt reports symptoms of chronic LBP with a recent increase in pain over the past 4-6 weeks after carrying/lifting [place] tree out of the house. -- Pt reports a history of LBP (R side > L side).      Chronic LE knee pain (L > R) pain, particularly with going up stairs. Pt denies that the knee give out and notes that his knee feel stable.    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 (no " "medical plans); Multiple steroid injections for low back; Ablation ( - \"that did not do anything at all\")          Recurrent probem    Patient Goals  Patient goals for therapy: decreased pain, increased motion, independence with ADLs/IADLs and return to sport/leisure activities  Patient goal: Return to playing golf  Pain  Current pain ratin  At best pain ratin  At worst pain ratin (1 day over the past week \"it was high.\" Pt notes that his symptoms were better the next day.)  Location: lower back - bilateral  Quality: dull ache  Relieving factors: relaxation, rest and change in position (Cat-cow; HEP)  Aggravating factors: lifting (Lifting/carrying fire wood (avoiding due to concerns for pain); Bending/lifting heavy items (greater than 10 pounds))  Progression: improved    Social Support  Steps to enter house: yes (Front: 1; Back: 13)  Stairs in house: yes (2 sets: 13 steps)   Lives in: multiple-level home  Lives with: spouse    Employment status: not working ()  Treatments  Previous treatment: injection treatment and medication  Current treatment: injection treatment, medication and physical therapy  Current treatment comments: Injection in left shoulder within past year; B/L [facility] 2024.         Objective     Concurrent Complaints  Negative for night pain, disturbed sleep, bladder dysfunction and bowel dysfunction    Static Posture     Head  Forward.    Shoulders  Depressed and rounded.    Thoracic Spine  Hyperkyphosis.    Lumbar Spine   Flattened and decreased lordosis.     Postural Observations  Seated posture: fair  Standing posture: fair      Palpation     Right   Tenderness of the erector spinae, lumbar interspinals, lumbar paraspinals and quadratus lumborum.     Tenderness     Lumbar Spine  No tenderness in the spinous process.     Right Hip   Tenderness in the PSIS and sacroiliac joint.     Neurological Testing     Sensation     Lumbar   Left   Intact: light " touch  Diminished: light touch    Right   Intact: light touch    Comments   Left light touch: Diminished along medial lower leg    Active Range of Motion     Lumbar   Flexion:  with pain Restriction level: moderate  Extension:  with pain Restriction level: minimal  Left lateral flexion: Active left lumbar lateral flexion: Right side thoracolumbar pain.    Restriction level: moderate  Right lateral flexion:  with pain Restriction level: moderate  Left rotation:  with pain Restriction level: minimal  Right rotation:  Restriction level: moderate    Joint Play     Hypomobile: L1, L2, L3, L4, L5 and S1     Strength/Myotome Testing     Left Hip   Planes of Motion   Flexion: 4+ (assessed seated)    Right Hip   Planes of Motion   Flexion: 4+ (Assessed seated)    Left Knee   Flexion: 5  Extension: 5    Right Knee   Flexion: 5  Extension: 5    Left Ankle/Foot   Dorsiflexion: 5    Right Ankle/Foot   Dorsiflexion: 5    Tests     Lumbar     Left   Negative slump test.     Right   Negative slump test.     Left Hip   Negative NIKKI and FADIR.     Right Hip   Negative NIKKI and FADIR.     Ambulation   Weight-Bearing Status   Weight-Bearing Status (Left): full weight bearing   Weight-Bearing Status (Right): full weight-bearing    Assistive device used: none    Quality of Movement During Gait     Additional Quality of Movement During Gait Details  Limited trunk rotation during ambulation    Functional Assessment        Comments  Balance Assessment: 8/21/2024  Modified Tandem (floor): 30+ sec w/ ea foot front  Tandem (floor): 32 sec (LLE front); 14 sec (RLE front)               Precautions: Neuropathy; HTN; Lymes Disease; PMH includes LLE Total Hip Replacement, RLE/LLE total knee replacements --- 2019: Laminectomy C3; Laminectomy L3 and Fusion L4-L5       5/16 5/23 5/30 6/13 6/20 6/27 7/18 7/25 8/15 8/21   Manuals             STM/MFR KS (lumbar; posterior pelvis) KS (lumbar; posterior pelvis) KS (lumbar; posterior  KS (lumbar;  "posterior  KS (lumbar; posterior  KS (lumbar; posterior pelvis) KS (lumbar; posterior pelvis) KS (lumbar; posterior pelvis) KS (lumbar; posterior pelvis) KS (lumbar; posterior pelvis)   Lumbar PA KS (+ SIJ) KS (+ SIJ) KS (+ SIJ) KS (+ SIJ) KS (+ SIJ) KS (+ SIJ) KS (+ SIJ) KS (+ SIJ) KS (+ SIJ) KS (+ SIJ)                             Neuro Re-Ed             Sciatic Nerve Glide - Supine             TA Activation/Core brace             TA + Iso Hip ADD             TA + Bent Knee Fallout Green TB 2x10 w/ 3\" hold            Hook-lying Core Brace + March             PPT + Core Brace w/ Physioball press - Hook-lying      2x10 w/ 5\" hold       Core Brace + SLR   1.5# 2x10 ea LE  1.5# 3x10 ea LE        Paloff Press   Blue TB 2x10 w/ 3\" hold  Green TB 2x10 w/ 3\" hold ea dir.        Sciatic nerve glide seated     10x ea LE        Standard KISHORE: Foam        1x45\" EO/EC/HTs 1x45\" EO/EC/HTs    Narrow KISHORE: Foam         1x45\" EO/EC/HTs 1x45\" EO/EC/HTs    Balance Assessment          KS                Ther Ex             Bent Knee Fallout             PPT             Open book      15x w/ 3\" hold ea dir   15x w/ 3\" hold ea dir    LTR    10x ea dir  15x w/ 3\" hold ea dir   15x w/ 3\" hold ea dir    Seated Lumbar Flex w/ PB             Mid Row  Silver TB 3x12 Silver TB 3x12 (standing on foam)  Silver TB 3x12 (standing on foam)        Shld Ext/Lat Pulldown   Black TB 3x12 (standing on foam)  Black TB 3x12 (standing on foam)        Shld Ext + March  Blue TB 3x 10 ea LE, alt           Shld Ext AAROM w/ Cane - w/ mild trunk rotation             LE Bike - for lower quarter mobility; endurance L2 8' L2 8' L2 8' L1 10' L1 10' L1 10' L2 10' L2 10' L1 10' L2 10'   Clamshell in s/l   4# 2x10 ea LE          Standing Heel Raises 3x10            Standing Hip Ext 3# 3x10 ea LE 4# 3x10 ea LE (Standing Off Step) 4# 2x10 ea LE (Standing Off Step)     4# 3x10 ea LE (Standing Off Step)     Standing Hip ABD 3# 3x10 ea LE 4# 3x10 ea LE (Standing Off Step)      " "4# 3x10 ea LE (Standing Off Step)     Shld horizontal ABD  Green TB 2x10           Standing PND D2 Shld Flex  Green TB 2x10 ea UE           Piriformis Stretch  2x30\"           Trunk Rotation - Golf swing stretch    10x ea dir                                   Reverse clamshell in s/l                          Anatomy as it relates to pt's symptoms/presentation        KS     Address pt's questions PRN  KS KS KS  KS KS KS KS KS   HEP KS KS KS KS      KS   Update on pt's status/symptoms KS KS KS KS KS KS KS KS KS KS                Ther Activity             FOTO Questionnaire    KS   KS   KS   HI: Update on pt's symptoms/status/function    KS   KS   KS   HI: Objective assessment    KS   KS   KS                Gait Training                                       Modalities                                              "

## 2024-08-21 ENCOUNTER — OFFICE VISIT (OUTPATIENT)
Dept: PHYSICAL THERAPY | Facility: CLINIC | Age: 79
End: 2024-08-21
Payer: MEDICARE

## 2024-08-21 DIAGNOSIS — I10 HYPERTENSION, UNSPECIFIED TYPE: ICD-10-CM

## 2024-08-21 DIAGNOSIS — G89.29 CHRONIC BILATERAL LOW BACK PAIN WITHOUT SCIATICA: Primary | ICD-10-CM

## 2024-08-21 DIAGNOSIS — M54.50 CHRONIC BILATERAL LOW BACK PAIN WITHOUT SCIATICA: Primary | ICD-10-CM

## 2024-08-21 DIAGNOSIS — G62.9 NEUROPATHY: ICD-10-CM

## 2024-08-21 PROCEDURE — 97140 MANUAL THERAPY 1/> REGIONS: CPT

## 2024-08-21 PROCEDURE — 97530 THERAPEUTIC ACTIVITIES: CPT

## 2024-08-21 PROCEDURE — 97110 THERAPEUTIC EXERCISES: CPT

## 2024-08-28 NOTE — PROGRESS NOTES
Daily Note     Today's date: 2024  Patient name: Kevin Reid  : 1945  MRN: [id number]  Referring provider: Quirino Ott MD  Dx:   Encounter Diagnosis     ICD-10-CM    1. Chronic bilateral low back pain without sciatica  M54.50     G89.29       2. Hypertension, unspecified type  I10       3. Neuropathy  G62.9           Start Time: 1019  Stop Time: 1100  Total time in clinic (min): 41 minutes    Subjective: The pt reports that he is doing well. He had a follow-up appointment last week with the physician who performed injections to the SIJ. She offered and the pt is considering whether or not to get injections in September.      Objective: See treatment diary below       Assessment: The pt participated in a skilled physical therapy session that focused on manual intervention, neuromuscular re-education, and therapeutic exercise. HEP interventions were reviewed to re-assess form and to progress resistance/weight. Pt demonstrated good technique with need for occasional cueing, particularly for core brace to avoid doming. He was challenged by the progression as noted by effort required. He tolerated treatment well. The pt would benefit from continued PT to address impairments in order to return to his PLOF without limitations due to pain.       Plan: Continue per plan of care.      Precautions: Neuropathy; HTN; Lymes Disease; PMH includes LLE Total Hip Replacement, RLE/LLE total knee replacements --- 2019: Laminectomy C3; Laminectomy L3 and Fusion L4-L5       8/29    6/20 6/27 7/18 7/25 8/15 8/21   Manuals             STM/MFR KS (lumbar; posterior pelvis)    KS (lumbar; posterior pelvis) KS (lumbar; posterior pelvis) KS (lumbar; posterior pelvis) KS (lumbar; posterior pelvis) KS (lumbar; posterior pelvis) KS (lumbar; posterior pelvis)   Lumbar PA KS (+ SIJ)    KS (+ SIJ) KS (+ SIJ) KS (+ SIJ) KS (+ SIJ) KS (+ SIJ) KS (+ SIJ)                             Neuro Re-Ed             Sciatic Nerve Glide -  "Supine             TA Activation/Core brace 2x10 w/ 5\" hold            TA + Iso Hip ADD             TA + Bent Knee Fallout Blue TB 2x10 ea LE            Resisted Bridge Blue TB 2x10            Hook-lying Core Brace + March 2# 2x10 ea LE, alt            PPT + Core Brace w/ Physioball press - Hook-lying      2x10 w/ 5\" hold       Core Brace + SLR 2# 2x10 ea LE    1.5# 3x10 ea LE        Paloff Press     Green TB 2x10 w/ 3\" hold ea dir.        Sciatic nerve glide seated     10x ea LE        Standard KISHORE: Foam        1x45\" EO/EC/HTs 1x45\" EO/EC/HTs    Narrow KISHORE: Foam         1x45\" EO/EC/HTs 1x45\" EO/EC/HTs    Balance Assessment          KS                Ther Ex             Bent Knee Fallout             PPT             Open book 5x w/ 3\" hold ea dir     15x w/ 3\" hold ea dir   15x w/ 3\" hold ea dir    LTR 10x w/ 3\" hold ea dir     15x w/ 3\" hold ea dir   15x w/ 3\" hold ea dir    Seated Lumbar Flex w/ PB             Mid Row     Silver TB 3x12 (standing on foam)        Shld Ext/Lat Pulldown     Black TB 3x12 (standing on foam)        Shld Ext + March             Shld Ext AAROM w/ Cane - w/ mild trunk rotation             LE Bike - for lower quarter mobility; endurance L2 10'    L1 10' L1 10' L2 10' L2 10' L1 10' L2 10'   Clamshell in s/l             Standing Heel Raises             Standing Hip Ext        4# 3x10 ea LE (Standing Off Step)     Standing Hip ABD        4# 3x10 ea LE (Standing Off Step)     Shld horizontal ABD             Standing PND D2 Shld Flex             Piriformis Stretch             Trunk Rotation - Golf swing stretch                                       Reverse clamshell in s/l                          Anatomy as it relates to pt's symptoms/presentation        KS     Address pt's questions PRN KS     KS KS KS KS KS   HEP KS         KS   Update on pt's status/symptoms KS    KS KS KS KS KS KS                Ther Activity             FOTO Questionnaire       KS   KS   RI: Update on pt's " symptoms/status/function       KS   KS   UT: Objective assessment       KS   KS                Gait Training                                       Modalities

## 2024-08-29 ENCOUNTER — OFFICE VISIT (OUTPATIENT)
Dept: PHYSICAL THERAPY | Facility: CLINIC | Age: 79
End: 2024-08-29
Payer: MEDICARE

## 2024-08-29 DIAGNOSIS — I10 HYPERTENSION, UNSPECIFIED TYPE: ICD-10-CM

## 2024-08-29 DIAGNOSIS — G89.29 CHRONIC BILATERAL LOW BACK PAIN WITHOUT SCIATICA: Primary | ICD-10-CM

## 2024-08-29 DIAGNOSIS — M54.50 CHRONIC BILATERAL LOW BACK PAIN WITHOUT SCIATICA: Primary | ICD-10-CM

## 2024-08-29 DIAGNOSIS — G62.9 NEUROPATHY: ICD-10-CM

## 2024-08-29 PROCEDURE — 97112 NEUROMUSCULAR REEDUCATION: CPT

## 2024-08-29 PROCEDURE — 97140 MANUAL THERAPY 1/> REGIONS: CPT

## 2024-08-29 PROCEDURE — 97110 THERAPEUTIC EXERCISES: CPT

## 2024-09-05 ENCOUNTER — OFFICE VISIT (OUTPATIENT)
Dept: PHYSICAL THERAPY | Facility: CLINIC | Age: 79
End: 2024-09-05
Payer: MEDICARE

## 2024-09-05 DIAGNOSIS — I10 HYPERTENSION, UNSPECIFIED TYPE: ICD-10-CM

## 2024-09-05 DIAGNOSIS — M54.50 CHRONIC BILATERAL LOW BACK PAIN WITHOUT SCIATICA: Primary | ICD-10-CM

## 2024-09-05 DIAGNOSIS — G62.9 NEUROPATHY: ICD-10-CM

## 2024-09-05 DIAGNOSIS — G89.29 CHRONIC BILATERAL LOW BACK PAIN WITHOUT SCIATICA: Primary | ICD-10-CM

## 2024-09-05 PROCEDURE — 97112 NEUROMUSCULAR REEDUCATION: CPT | Performed by: PHYSICAL THERAPY ASSISTANT

## 2024-09-05 PROCEDURE — 97110 THERAPEUTIC EXERCISES: CPT | Performed by: PHYSICAL THERAPY ASSISTANT

## 2024-09-05 NOTE — PROGRESS NOTES
"Daily Note     Today's date: 2024  Patient name: Kevin Reid  : 1945  MRN: [id number]  Referring provider: Quirino Ott MD  Dx:   Encounter Diagnosis     ICD-10-CM    1. Chronic bilateral low back pain without sciatica  M54.50     G89.29       2. Hypertension, unspecified type  I10       3. Neuropathy  G62.9                      Subjective: The pt reports that he scheduled to have more SIJ injections done in October. Pt states pain has returned to same level as before the first series of SIJ injections.      Objective: See treatment diary below       Assessment: Good tolerance to TE as noted with pt demonstrating good technique with TE.  Occasional cues required for slower pace with TE and for TA bracing with exercises. .Pt tolerated treatment well and demonstrated appropriate fatigue post exercise. The pt would benefit from continued PT to address impairments in order to return to his PLOF without limitations due to pain.       Plan: Continue per plan of care.      Precautions: Neuropathy; HTN; Lymes Disease; PMH includes LLE Total Hip Replacement, RLE/LLE total knee replacements --- 2019: Laminectomy C3; Laminectomy L3 and Fusion L4-L5       8/29 9/5   6/20 6/27 7/18 7/25 8/15 8/21   Manuals             STM/MFR KS (lumbar; posterior pelvis) RK  (lumbar; posterior pelvis)   KS (lumbar; posterior pelvis) KS (lumbar; posterior pelvis) KS (lumbar; posterior pelvis) KS (lumbar; posterior pelvis) KS (lumbar; posterior pelvis) KS (lumbar; posterior pelvis)   Lumbar PA KS (+ SIJ)    KS (+ SIJ) KS (+ SIJ) KS (+ SIJ) KS (+ SIJ) KS (+ SIJ) KS (+ SIJ)                             Neuro Re-Ed             Sciatic Nerve Glide - Supine             TA Activation/Core brace 2x10 w/ 5\" hold 2x10 5\"           TA + Iso Hip ADD             TA + Bent Knee Fallout Blue TB 2x10 ea LE Blue TB 2x10 ea           Resisted Bridge Blue TB 2x10 Blue TB 2x10           Hook-lying Core Brace + # 2x10 ea LE, alt 2# 2x10 " "ea alt           PPT + Core Brace w/ Physioball press - Hook-lying      2x10 w/ 5\" hold       Core Brace + SLR 2# 2x10 ea LE 2# 2x10 ea   1.5# 3x10 ea LE        Paloff Press     Green TB 2x10 w/ 3\" hold ea dir.        Sciatic nerve glide seated     10x ea LE        Standard KISHORE: Foam        1x45\" EO/EC/HTs 1x45\" EO/EC/HTs    Narrow KISHORE: Foam         1x45\" EO/EC/HTs 1x45\" EO/EC/HTs    Balance Assessment          KS                Ther Ex             Bent Knee Fallout             PPT             Open book 5x w/ 3\" hold ea dir 5\" hold x5 ea    15x w/ 3\" hold ea dir   15x w/ 3\" hold ea dir    LTR 10x w/ 3\" hold ea dir 10x 5\" hold    15x w/ 3\" hold ea dir   15x w/ 3\" hold ea dir    Seated Lumbar Flex w/ PB             Mid Row     Silver TB 3x12 (standing on foam)        Shld Ext/Lat Pulldown     Black TB 3x12 (standing on foam)        Shld Ext + March             Shld Ext AAROM w/ Cane - w/ mild trunk rotation             LE Bike - for lower quarter mobility; endurance L2 10' L2 10'   L1 10' L1 10' L2 10' L2 10' L1 10' L2 10'   Clamshell in s/l             Standing Heel Raises             Standing Hip Ext        4# 3x10 ea LE (Standing Off Step)     Standing Hip ABD        4# 3x10 ea LE (Standing Off Step)     Shld horizontal ABD             Standing PND D2 Shld Flex             Piriformis Stretch             Trunk Rotation - Golf swing stretch                                       Reverse clamshell in s/l                          Anatomy as it relates to pt's symptoms/presentation        KS     Address pt's questions PRN KS     KS KS KS KS KS   HEP KS         KS   Update on pt's status/symptoms KS    KS KS KS KS KS KS                Ther Activity             FOTO Questionnaire       KS   KS   NM: Update on pt's symptoms/status/function       KS   KS   NM: Objective assessment       KS   KS                Gait Training                                       Modalities                                              "

## 2024-09-12 ENCOUNTER — OFFICE VISIT (OUTPATIENT)
Dept: PHYSICAL THERAPY | Facility: CLINIC | Age: 79
End: 2024-09-12
Payer: MEDICARE

## 2024-09-12 DIAGNOSIS — G89.29 CHRONIC BILATERAL LOW BACK PAIN WITHOUT SCIATICA: Primary | ICD-10-CM

## 2024-09-12 DIAGNOSIS — I10 HYPERTENSION, UNSPECIFIED TYPE: ICD-10-CM

## 2024-09-12 DIAGNOSIS — M54.50 CHRONIC BILATERAL LOW BACK PAIN WITHOUT SCIATICA: Primary | ICD-10-CM

## 2024-09-12 DIAGNOSIS — G62.9 NEUROPATHY: ICD-10-CM

## 2024-09-12 PROCEDURE — 97140 MANUAL THERAPY 1/> REGIONS: CPT

## 2024-09-12 PROCEDURE — 97110 THERAPEUTIC EXERCISES: CPT

## 2024-09-12 PROCEDURE — 97112 NEUROMUSCULAR REEDUCATION: CPT

## 2024-09-12 NOTE — PROGRESS NOTES
Daily Note     Today's date: 2024  Patient name: Kevin Reid  : 1945  MRN: [id number]  Referring provider: Quirino Ott MD  Dx:   Encounter Diagnosis     ICD-10-CM    1. Chronic bilateral low back pain without sciatica  M54.50     G89.29       2. Hypertension, unspecified type  I10       3. Neuropathy  G62.9           Start Time: 0800  Stop Time: 0845  Total time in clinic (min): 45 minutes    Subjective: Pt reports that he has been experiencing more sharp pains in his back as opposed to aching pains. He notes that the pain comes on when he when he moves in certain directions. He is uncertain what may have provoked the change/increase in symptoms. -- Pt reports that he has been focusing on engaging his abdominal muscles while exercising at the gym.      Objective: See treatment diary below      Assessment: The pt participated in a skilled physical therapy session that focused on manual intervention, neuromuscular re-education, and therapeutic exercise. Resisted golf swing follow through and chops were introduced during today's treatment session to improve neuromuscular control, strength, and mobility necessary for activities that require trunk rotation (e.g., golf, lifting items in/out of car). Balance board was added as a program progression to address balance impairments. He presented with significant unsteadiness and required intermittent HHA for stability. Considering the pt's PMH of neuropathy, it remains important to address these impairments to improve pt safety. He tolerated treatment well. The pt would benefit from continued PT to address impairments in order to return to his PLOF without limitations due to pain.       Plan: Continue per plan of care.      Precautions: Neuropathy; HTN; Lymes Disease; PMH includes LLE Total Hip Replacement, RLE/LLE total knee replacements --- 2019: Laminectomy C3; Laminectomy L3 and Fusion L4-L5       8/29 9/5 9/12    7/18 7/25 8/15 8/21   Manuals       "       STM/MFR KS (lumbar; posterior pelvis) RK  (lumbar; posterior pelvis) KS (lumbar; posterior pelvis)    KS (lumbar; posterior pelvis) KS (lumbar; posterior pelvis) KS (lumbar; posterior pelvis) KS (lumbar; posterior pelvis)   Lumbar PA KS (+ SIJ)  KS (+ SIJ)    KS (+ SIJ) KS (+ SIJ) KS (+ SIJ) KS (+ SIJ)                             Neuro Re-Ed             Sciatic Nerve Glide - Supine             TA Activation/Core brace 2x10 w/ 5\" hold 2x10 5\"           TA + Iso Hip ADD             TA + Bent Knee Fallout Blue TB 2x10 ea LE Blue TB 2x10 ea           Resisted Bridge Blue TB 2x10 Blue TB 2x10           Hook-lying Core Brace + March 2# 2x10 ea LE, alt 2# 2x10 ea alt           PPT + Core Brace w/ Physioball press - Hook-lying             Core Brace + SLR 2# 2x10 ea LE 2# 2x10 ea           Paloff Press             Sciatic nerve glide seated             Standard KISHORE: Foam   1x45\" EO/EC/HTs     1x45\" EO/EC/HTs 1x45\" EO/EC/HTs    Narrow KISHORE: Foam         1x45\" EO/EC/HTs 1x45\" EO/EC/HTs    Balance Board   10x taps FWD/BKWD                       Balance Assessment          KS   Golf Swing Follow Through - Resisted    Green TB 2x10 ea dir          Chop   Blue TB 2x10 ea dir                       Ther Ex             Bent Knee Fallout             PPT             Open book 5x w/ 3\" hold ea dir 5\" hold x5 ea       15x w/ 3\" hold ea dir    LTR 10x w/ 3\" hold ea dir 10x 5\" hold       15x w/ 3\" hold ea dir    Seated Lumbar Flex w/ PB             Mid Row             Shld Ext/Lat Pulldown   Black TB 3x10          Shld Ext + March             Shld Ext AAROM w/ Cane - w/ mild trunk rotation             LE Bike - for lower quarter mobility; endurance L2 10' L2 10' L2 10'    L2 10' L2 10' L1 10' L2 10'   Clamshell in s/l             Standing Heel Raises             Standing Hip Ext        4# 3x10 ea LE (Standing Off Step)     Standing Hip ABD        4# 3x10 ea LE (Standing Off Step)     Shld horizontal ABD             Standing PND D2 " Shld Flex             Piriformis Stretch             Trunk Rotation - Golf swing stretch                                       Reverse clamshell in s/l                          Anatomy as it relates to pt's symptoms/presentation        KS     Address pt's questions PRN KS      KS KS KS KS   HEP KS  KS       KS   Update on pt's status/symptoms KS  KS    KS KS KS KS                Ther Activity             FOTO Questionnaire       KS   KS   VT: Update on pt's symptoms/status/function       KS   KS   VT: Objective assessment       KS   KS                Gait Training                                       Modalities

## 2024-09-19 ENCOUNTER — OFFICE VISIT (OUTPATIENT)
Dept: PHYSICAL THERAPY | Facility: CLINIC | Age: 79
End: 2024-09-19
Payer: MEDICARE

## 2024-09-19 DIAGNOSIS — M54.50 CHRONIC BILATERAL LOW BACK PAIN WITHOUT SCIATICA: Primary | ICD-10-CM

## 2024-09-19 DIAGNOSIS — G62.9 NEUROPATHY: ICD-10-CM

## 2024-09-19 DIAGNOSIS — G89.29 CHRONIC BILATERAL LOW BACK PAIN WITHOUT SCIATICA: Primary | ICD-10-CM

## 2024-09-19 DIAGNOSIS — I10 HYPERTENSION, UNSPECIFIED TYPE: ICD-10-CM

## 2024-09-19 PROCEDURE — 97140 MANUAL THERAPY 1/> REGIONS: CPT

## 2024-09-19 PROCEDURE — 97110 THERAPEUTIC EXERCISES: CPT

## 2024-09-19 NOTE — PROGRESS NOTES
"Daily Note     Today's date: 2024  Patient name: Kevin Reid  : 1945  MRN: [id number]  Referring provider: Quirino Ott MD  Dx:   Encounter Diagnosis     ICD-10-CM    1. Chronic bilateral low back pain without sciatica  M54.50     G89.29       2. Hypertension, unspecified type  I10       3. Neuropathy  G62.9           Start Time: 0800  Stop Time: 0844  Total time in clinic (min): 44 minutes    Subjective: The pt reports that his back feels sore. He notes that he was working on his truck \"doing things I shouldn't have.\"       Objective: See treatment diary below      Assessment: The pt participated in a skilled physical therapy session that focused on manual intervention, neuromuscular re-education, and therapeutic exercise. Standing hip extension and hip abduction were performed while standing on a foam pad (soft surface) to address impairments in static/dynamic balance and strength of foot intrinsic musculature associated with chronic neuropathy. STS transfers were introduced to improve functional LE strength. He tolerated treatment well and without complaints of pain. The pt would benefit from continued PT  to address impairments in order to return to his PLOF without limitations due to pain.       Plan: Continue per plan of care.  Progress note during next visit.      Precautions: Neuropathy; HTN; Lymes Disease; PMH includes LLE Total Hip Replacement, RLE/LLE total knee replacements --- 2019: Laminectomy C3; Laminectomy L3 and Fusion L4-L5       8/29 9/5 9/12 9/12   7/18 7/25 8/15 8/21   Manuals             STM/MFR KS (lumbar; posterior pelvis) RK  (lumbar; posterior pelvis) KS (lumbar; posterior pelvis) KS (lumbar; thoracic; posterior pelvis)   KS (lumbar; posterior pelvis) KS (lumbar; posterior pelvis) KS (lumbar; posterior pelvis) KS (lumbar; posterior pelvis)   Lumbar PA KS (+ SIJ)  KS (+ SIJ) KS (+ SIJ)   KS (+ SIJ) KS (+ SIJ) KS (+ SIJ) KS (+ SIJ)                             Neuro " "Re-Ed             Sciatic Nerve Glide - Supine             TA Activation/Core brace 2x10 w/ 5\" hold 2x10 5\"           TA + Iso Hip ADD             TA + Bent Knee Fallout Blue TB 2x10 ea LE Blue TB 2x10 ea           Resisted Bridge Blue TB 2x10 Blue TB 2x10           Hook-lying Core Brace + March 2# 2x10 ea LE, alt 2# 2x10 ea alt           PPT + Core Brace w/ Physioball press - Hook-lying             Core Brace + SLR 2# 2x10 ea LE 2# 2x10 ea           Paloff Press             Sciatic nerve glide seated             Standard KISHORE: Foam   1x45\" EO/EC/HTs     1x45\" EO/EC/HTs 1x45\" EO/EC/HTs    Narrow KISHORE: Foam     1x45\" EO/EC/HTs    1x45\" EO/EC/HTs 1x45\" EO/EC/HTs    Balance Board   10x taps FWD/BKWD                       Balance Assessment          KS   Golf Swing Follow Through - Resisted    Green TB 2x10 ea dir          Chop   Blue TB 2x10 ea dir                       Ther Ex             Bent Knee Fallout             PPT             Open book 5x w/ 3\" hold ea dir 5\" hold x5 ea  10x ea dir     15x w/ 3\" hold ea dir    LTR 10x w/ 3\" hold ea dir 10x 5\" hold       15x w/ 3\" hold ea dir    Seated Lumbar Flex w/ PB             Mid Row    Silver TB 3x10         Shld Ext/Lat Pulldown   Black TB 3x10          Shld Ext + March    Black TB 2x10 ea LE, alt         Shld Ext AAROM w/ Cane - w/ mild trunk rotation             LE Bike - for lower quarter mobility; endurance L2 10' L2 10' L2 10' L2 10'   L2 10' L2 10' L1 10' L2 10'   Clamshell in s/l             Standing Heel Raises             Standing Hip Ext    4# 3x10 ea LE (Standing on Foam)    4# 3x10 ea LE (Standing Off Step)     Standing Hip ABD    4# 3x10 ea LE (Standing on Foam)    4# 3x10 ea LE (Standing Off Step)     Shld horizontal ABD             Standing PND D2 Shld Flex             Piriformis Stretch             Trunk Rotation - Golf swing stretch                                       Reverse clamshell in s/l                          Anatomy as it relates to pt's " symptoms/presentation        KS     Address pt's questions PRN KS   KS   KS KS KS KS   HEP KS  KS       KS   Update on pt's status/symptoms KS  KS KS   KS KS KS KS                Ther Activity             FOTO Questionnaire       KS   KS   UT: Update on pt's symptoms/status/function       KS   KS   UT: Objective assessment       KS   KS   STS transfers    3000g MB 2x10                      Gait Training                                       Modalities

## 2024-09-26 ENCOUNTER — OFFICE VISIT (OUTPATIENT)
Dept: PHYSICAL THERAPY | Facility: CLINIC | Age: 79
End: 2024-09-26
Payer: MEDICARE

## 2024-09-26 DIAGNOSIS — M54.50 CHRONIC BILATERAL LOW BACK PAIN WITHOUT SCIATICA: Primary | ICD-10-CM

## 2024-09-26 DIAGNOSIS — G89.29 CHRONIC BILATERAL LOW BACK PAIN WITHOUT SCIATICA: Primary | ICD-10-CM

## 2024-09-26 DIAGNOSIS — I10 HYPERTENSION, UNSPECIFIED TYPE: ICD-10-CM

## 2024-09-26 DIAGNOSIS — G62.9 NEUROPATHY: ICD-10-CM

## 2024-09-26 PROCEDURE — 97140 MANUAL THERAPY 1/> REGIONS: CPT | Performed by: PHYSICAL THERAPY ASSISTANT

## 2024-09-26 PROCEDURE — 97110 THERAPEUTIC EXERCISES: CPT | Performed by: PHYSICAL THERAPY ASSISTANT

## 2024-09-26 PROCEDURE — 97112 NEUROMUSCULAR REEDUCATION: CPT | Performed by: PHYSICAL THERAPY ASSISTANT

## 2024-09-26 NOTE — PROGRESS NOTES
"Daily Note     Today's date: 2024  Patient name: Kevin Reid  : 1945  MRN: [id number]  Referring provider: Quirino Ott MD  Dx:   Encounter Diagnosis     ICD-10-CM    1. Chronic bilateral low back pain without sciatica  M54.50     G89.29       2. Hypertension, unspecified type  I10       3. Neuropathy  G62.9                      Subjective: Pt reports he is doing well.  Reports he is going on vacation soon and is anxious to see how his back will hold up with activities at the beach.       Objective: See treatment diary below      Assessment: The pt participated in a skilled physical therapy session that focused on manual intervention, neuromuscular re-education, and therapeutic exercise.  He tolerated treatment well and without complaints of pain. The pt would benefit from continued PT  to address impairments in order to return to his PLOF without limitations due to pain.       Plan: Continue per plan of care.  Progress note during next visit.      Precautions: Neuropathy; HTN; Lymes Disease; PMH includes LLE Total Hip Replacement, RLE/LLE total knee replacements --- 2019: Laminectomy C3; Laminectomy L3 and Fusion L4-L5       8/29 9/5 9/12 9/12 9/26  7/18 7/25 8/15 8/21   Manuals             STM/MFR KS (lumbar; posterior pelvis) RK  (lumbar; posterior pelvis) KS (lumbar; posterior pelvis) KS (lumbar; thoracic; posterior pelvis) RK lumbar glutes R>L  KS (lumbar; posterior pelvis) KS (lumbar; posterior pelvis) KS (lumbar; posterior pelvis) KS (lumbar; posterior pelvis)   Lumbar PA KS (+ SIJ)  KS (+ SIJ) KS (+ SIJ)   KS (+ SIJ) KS (+ SIJ) KS (+ SIJ) KS (+ SIJ)                             Neuro Re-Ed             Sciatic Nerve Glide - Supine             TA Activation/Core brace 2x10 w/ 5\" hold 2x10 5\"           TA + Iso Hip ADD             TA + Bent Knee Fallout Blue TB 2x10 ea LE Blue TB 2x10 ea           Resisted Bridge Blue TB 2x10 Blue TB 2x10           Hook-lying Core Brace + # 2x10 ea " "LE, alt 2# 2x10 ea alt           PPT + Core Brace w/ Physioball press - Hook-lying             Core Brace + SLR 2# 2x10 ea LE 2# 2x10 ea           Paloff Press             Sciatic nerve glide seated             Standard KISHORE: Foam   1x45\" EO/EC/HTs     1x45\" EO/EC/HTs 1x45\" EO/EC/HTs    Narrow KISHORE: Foam     1x45\" EO/EC/HTs    1x45\" EO/EC/HTs 1x45\" EO/EC/HTs    Balance Board   10x taps FWD/BKWD  [id number] fwd/bkwd                     Balance Assessment          KS   Golf Swing Follow Through - Resisted    Green TB 2x10 ea dir          Chop   Blue TB 2x10 ea dir                       Ther Ex             Bent Knee Fallout             PPT             Open book 5x w/ 3\" hold ea dir 5\" hold x5 ea  10x ea dir 10x ea dir    15x w/ 3\" hold ea dir    LTR 10x w/ 3\" hold ea dir 10x 5\" hold       15x w/ 3\" hold ea dir    Seated Lumbar Flex w/ PB             Mid Row    Silver TB 3x10 Silver TB 3x10        Shld Ext/Lat Pulldown   Black TB 3x10  Silver TB 3x10        Shld Ext + March    Black TB 2x10 ea LE, alt Blk TB 2x10 ea LE alt        Shld Ext AAROM w/ Cane - w/ mild trunk rotation             LE Bike - for lower quarter mobility; endurance L2 10' L2 10' L2 10' L2 10' L2 10'  L2 10' L2 10' L1 10' L2 10'   Clamshell in s/l             Standing Heel Raises             Standing Hip Ext    4# 3x10 ea LE (Standing on Foam) 4# 3x10 ea LE (Standing on Foam)   4# 3x10 ea LE (Standing Off Step)     Standing Hip ABD    4# 3x10 ea LE (Standing on Foam) 4# 3x10 ea LE (Standing on Foam)   4# 3x10 ea LE (Standing Off Step)     Shld horizontal ABD             Standing PND D2 Shld Flex             Piriformis Stretch             Trunk Rotation - Golf swing stretch                                       Reverse clamshell in s/l                          Anatomy as it relates to pt's symptoms/presentation        KS     Address pt's questions PRN KS   KS   KS KS KS KS   HEP KS  KS       KS   Update on pt's status/symptoms KS  KS KS   KS KS KS KS      "           Ther Activity             FOTO Questionnaire       KS   KS   CA: Update on pt's symptoms/status/function       KS   KS   CA: Objective assessment       KS   KS   STS transfers    3000g MB 2x10 3000g MB 2x10                     Gait Training                                                    Modalities

## 2024-10-03 ENCOUNTER — APPOINTMENT (OUTPATIENT)
Dept: PHYSICAL THERAPY | Facility: CLINIC | Age: 79
End: 2024-10-03
Payer: MEDICARE

## 2024-10-24 ENCOUNTER — OFFICE VISIT (OUTPATIENT)
Dept: PHYSICAL THERAPY | Facility: CLINIC | Age: 79
End: 2024-10-24
Payer: MEDICARE

## 2024-10-24 DIAGNOSIS — I10 HYPERTENSION, UNSPECIFIED TYPE: ICD-10-CM

## 2024-10-24 DIAGNOSIS — M54.50 CHRONIC BILATERAL LOW BACK PAIN WITHOUT SCIATICA: Primary | ICD-10-CM

## 2024-10-24 DIAGNOSIS — G62.9 NEUROPATHY: ICD-10-CM

## 2024-10-24 DIAGNOSIS — G89.29 CHRONIC BILATERAL LOW BACK PAIN WITHOUT SCIATICA: Primary | ICD-10-CM

## 2024-10-24 PROCEDURE — 97140 MANUAL THERAPY 1/> REGIONS: CPT

## 2024-10-24 PROCEDURE — 97110 THERAPEUTIC EXERCISES: CPT

## 2024-10-24 PROCEDURE — 97530 THERAPEUTIC ACTIVITIES: CPT

## 2024-10-24 NOTE — PROGRESS NOTES
PT Progress Report/Update POC    Today's date: 10/24/2024  Patient name: Kevin Reid  : 1945  MRN: [id number]  Referring provider: Quirino Ott MD  Dx:   Encounter Diagnosis     ICD-10-CM    1. Chronic bilateral low back pain without sciatica  M54.50     G89.29       2. Neuropathy  G62.9       3. Hypertension, unspecified type  I10           Start Time: 08  Stop Time: 930  Total time in clinic (min): 45 minutes    Assessment  Impairments: abnormal or restricted ROM, activity intolerance, impaired balance, impaired physical strength, pain with function, poor posture , poor body mechanics, participation limitations, activity limitations and endurance  Irritability comments: low to moderate    Assessment details: The pt is a 79 year old male who presents to skilled physical therapy services due to a decline in functional status related to an onset of acute on chronic LBP associated with lifting/carrying [place] tree in January. At this point in his POC, the pt has received 26 skilled physical therapy sessions. The pt's POC was on hold over the past 4 weeks because he was on vacation. His most recent appointment was on 2024. Since his previous appointment in September, he experienced some increase in symptoms severity/frequency and decreased positional tolerance as noted by subjective report. Objective measure reflects a decrease in leg strength. A 9 point decrease in his FOTO Functional Status measure further reflects regression in his functional mobility and activity capacity since his previous MO/while his POC was on hold during his vacation. He continues to present with impairments in pain, thoracolumbar mobility, flexibility, core/abdominal strength (diastasis recti - doming), static/dynamic balance (PMH neuropathy; balance assessment), and LE strength. As a result of these impairments, the pt has difficulty with the following functional activities: bending, lifting, carrying, and  recreational activities (e.g., golf, boat maintenance). POC will include manual therapy for flexibility/mobility and symptom modulation, modalities (PRN), TE/TA/NR interventions for functional strength and neuromuscular control, HEP, and pt education. The pt will continue to benefit from skilled physical therapy services to address impairments in order to return to his PLOF without limitations due to pain. Considering the chronicity of the pt's symptoms as well as his PMH (e.g., laminectomy/fusion lumbar spine), progress is slower and he will benefit from an extended POC when compared to a pt who is less medically complex.    Thank you for the referral.    Barriers to therapy: Neuropathy; HTN; Lymes Disease; PMH includes LLE Total Hip Replacement, RLE/LLE total knee replacements --- 2019: Laminectomy C3; Laminectomy L3 and Fusion L4-L5  Barriers to intervention: medical complexity  Understanding of Dx/Px/POC: good     Prognosis: good    Goals  STGs: Achieve within 5 weeks  1. Increase functional LE strength to at least 4+/5 for each major muscle group in order to reflect improved functional strength for ADLs/IADLs.   (ONGOING)  2. Decrease pain to no greater than 4/10 on the NPRS in order to reflect improved activity capacity/tolerance.    (ONGOING)  3. Independent with HEP to aid in continued progress between treatment sessions.   (MET)  4. Increase FOTO Functional Status measure by at least 5 points in order to reflect improvements in functional status and activity capacity.    (MET)    LTGs: Achieve by discharge  1. Decrease pain to no greater than 2/10 on the NPRS in order to reflect improved activity capacity/tolerance.   (ONGOING)  2. Progress to at least 90% return to PLOF per pt report in order to reflect improvements in functional status and activity capacity.       (PROGRESSING)  3. Increase FOTO Functional Status measure score to at least benchmark score.    (MET)  4. Independent with HEP for long-term  "management of symptoms and functional mobility.    (ONGOING)      Plan  Patient would benefit from: skilled physical therapy  Referral necessary: Yes  Planned modality interventions: thermotherapy: hydrocollator packs    Planned therapy interventions: abdominal trunk stabilization, joint mobilization, manual therapy, body mechanics training, neuromuscular re-education, patient education, postural training, flexibility, strengthening, stretching, functional ROM exercises, therapeutic activities, therapeutic exercise, home exercise program and balance    Frequency: 1x week  Plan of Care beginning date: 2/29/2024  Plan of Care expiration date: 11/29/2024  Treatment plan discussed with: patient  Plan details: 10/24/2024: Request extension of current POC at a frequency of 1x/wk to address remaining impairments -- POC was temporarily on hold while pt was away/on vacation for 4 weeks.     8/21/2024: Request 8 week extension to current POC at frequency of 1x/wk to address remaining impairments.    7/18/2024: Request extension of current POC at a frequency of 1x/wk to address remaining impairments -- Pt will be on vacation out of state during end of July/early August and will need temporary hold of POC. Plan to reassess pt status upon return from vacation.        Subjective Evaluation    History of Present Illness  Date of onset: 1/15/2024  Mechanism of injury:   PROGRESS REPORT: 10/24/2024  Pt went for 2 long vacations over the past few weeks. He notes a progressive increase in leg pain (ache) while driving. He stopped once during 7-8 hour drives. Tingling/numbness in lower legs and feet when legs were sore. -- \"I try to avoid lifting anything too big.\" Items that are too big: dog food (33#), bird seed (40#), salt for water softener (40#), case of water. Pt explains that he will transfer these items to a cart. -- Scheduled for injections in the low back on Monday, 10/28/2024.     PROGRESS REPORT: 8/21/2024  The pt reports " "that his symptoms are \"about the same\" as last week. He notes central low back pain on both sides. He denies symptoms down his legs. -- Pt has a follow-up with the physician who performed injections to [facility] later this morning. -- Pt reports he is better able to reach back. -- Pt notes needing to be careful when lifting/moving heavy items (e.g., air conditioner). -- Pt reports decreased use of gabapentin over the past 2 weeks due to concerns about how the medication can effect balance. He notes no change in his neuropathy after dose change. He has not talked to his physician about the change. He plans to discuss this with his physician at his appointment today.    PROGRESS REPORT: 7/18/2024  Pt reports that his back is feeling \"better.\" He received injections at both [facility] on 7/8/2024. Pt notes that he notices it the most in the morning with a decrease in pain. -- Improved standing tolerance. Pt notes able to stand longer than 30 minutes without issue (e.g., shopping). -- Pt reports that it is easier to manage stairs and to get out of bed.      PROGRESS REPORT: 6/13/2024  The pt reports a change and increase in his low back pain over the past two weeks. He explains that he has pain across his lower back on both sides with symptoms radiating to the sides of his hips/pelvis and into the L thigh. He is unable to recall a particular event/injury that would have caused the change in his symptoms. -- He notes that he plans to reach out to one of his prior physicians for an assessment and to discuss injections (previous treatment). -- Pt reports 4/10-5/10 pain upon arrival.     PROGRESS REPORT: 5/2/2024  \"I tweaked my back this weekend.\" Pt explains that he was polishing his boat. He notes significant pain in the right side of of his low back and an on onset of aching pain down the LLE. Pt notes that he was out working on the boat for about 3 hours. Pt has used heat, ice, and Tylenol to help mange pain. -- Pt has not played " "golf recently (personal goal). -- Pt reports that he is avoiding lifting heavier items with concerns that he may hurt his back.      PROGRESS REPORT: 3/28/2024  Pt reports low back pain on right side. Pt denies tingling/numbness down LE. -- Pt reports that he is going to [employer] every 3rd day (better than every other day to allow for rest). Pt has some pain with elliptical, but no pain with other exercises. -- Back pain does not wake him up at night.    INITIAL EVALUATION:  Pt reports symptoms of chronic LBP with a recent increase in pain over the past 4-6 weeks after carrying/lifting [place] tree out of the house. -- Pt reports a history of LBP (R side > L side).      Chronic LE knee pain (L > R) pain, particularly with going up stairs. Pt denies that the knee give out and notes that his knee feel stable.    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 (no medical plans); Multiple steroid injections for low back; Ablation ( - \"that did not do anything at all\")          Recurrent probem    Patient Goals  Patient goals for therapy: decreased pain, increased motion, independence with ADLs/IADLs and return to sport/leisure activities  Patient goal: Return to playing golf  Pain  Current pain ratin  At best pain ratin  At worst pain ratin  Location: lower back - bilateral  Quality: dull ache  Relieving factors: relaxation, rest and change in position (Cat-cow; HEP)  Aggravating factors: lifting (Lifting/carrying fire wood (avoiding due to concerns for pain); Bending/lifting heavy items (greater than 10 pounds))    Social Support  Steps to enter house: yes (Front: 1; Back: 13)  Stairs in house: yes (2 sets: 13 steps)   Lives in: multiple-level home  Lives with: spouse    Employment status: not working ()  Treatments  Previous treatment: injection treatment and medication  Current treatment: injection treatment, medication and physical therapy  Current treatment comments: Injection " in left shoulder within past year; B/L [facility] 7/8/2024.         Objective     Concurrent Complaints  Negative for night pain, disturbed sleep, bladder dysfunction and bowel dysfunction    Static Posture     Head  Forward.    Shoulders  Depressed and rounded.    Thoracic Spine  Hyperkyphosis.    Lumbar Spine   Flattened and decreased lordosis.     Postural Observations  Seated posture: fair  Standing posture: fair      Palpation     Right   Tenderness of the erector spinae, lumbar interspinals, lumbar paraspinals and quadratus lumborum.     Tenderness     Lumbar Spine  No tenderness in the spinous process.     Right Hip   Tenderness in the PSIS and sacroiliac joint.     Neurological Testing     Sensation     Lumbar   Left   Intact: light touch  Diminished: light touch    Right   Intact: light touch    Comments   Left light touch: Diminished along medial lower leg    Active Range of Motion     Lumbar   Flexion:  with pain Restriction level: moderate  Extension:  with pain  Left lateral flexion: Active left lumbar lateral flexion: Right side thoracolumbar pain.    Restriction level: moderate  Right lateral flexion:  with pain Restriction level: moderate  Left rotation: Active left lumbar rotation: 3/10 pain.  with pain Restriction level: minimal  Right rotation: Active right lumbar rotation: 3/10 pain.  with pain Restriction level: moderate    Joint Play     Hypomobile: L1, L2, L3, L4, L5 and S1     Strength/Myotome Testing     Left Hip   Planes of Motion   Flexion: 4+ (assessed seated)    Right Hip   Planes of Motion   Flexion: 4+ (Assessed seated)    Left Knee   Flexion: 5  Extension: 5    Right Knee   Flexion: 4+  Extension: 5    Left Ankle/Foot   Dorsiflexion: 5    Right Ankle/Foot   Dorsiflexion: 4+    Tests     Lumbar     Left   Negative slump test.     Right   Negative slump test.     Left Hip   Negative NIKKI and FADIR.     Right Hip   Negative NIKKI and FADIR.     Ambulation   Weight-Bearing Status  "  Weight-Bearing Status (Left): full weight bearing   Weight-Bearing Status (Right): full weight-bearing    Assistive device used: none    Quality of Movement During Gait     Additional Quality of Movement During Gait Details  Limited trunk rotation during ambulation    Functional Assessment        Comments  5x STS Test (10/24/2024): 12 sec (unsteady); 11 sec. (Improvement in steadiness)  -- Standard chair; no UE support    Balance Assessment: 8/21/2024  Modified Tandem (floor): 30+ sec w/ ea foot front  Tandem (floor): 32 sec (LLE front); 14 sec (RLE front)               Precautions: Neuropathy; HTN; Lymes Disease; PMH includes LLE Total Hip Replacement, RLE/LLE total knee replacements --- 2019: Laminectomy C3; Laminectomy L3 and Fusion L4-L5       8/29 9/5 9/12 9/12 9/26 10/24       Manuals             STM/MFR KS (lumbar; posterior pelvis) RK  (lumbar; posterior pelvis) KS (lumbar; posterior pelvis) KS (lumbar; thoracic; posterior pelvis) RK lumbar glutes R>L KS (lumbar; posterior pelvis)       Lumbar PA KS (+ SIJ)  KS (+ SIJ) KS (+ SIJ)  KS (+ SIJ)                                 Neuro Re-Ed             Sciatic Nerve Glide - Supine             TA Activation/Core brace 2x10 w/ 5\" hold 2x10 5\"           TA + Iso Hip ADD             TA + Bent Knee Fallout Blue TB 2x10 ea LE Blue TB 2x10 ea           Resisted Bridge Blue TB 2x10 Blue TB 2x10    2x10       Hook-lying Core Brace + March 2# 2x10 ea LE, alt 2# 2x10 ea alt    2x10 ea LE, alt.       PPT + Core Brace w/ Physioball press - Hook-lying             Core Brace + SLR 2# 2x10 ea LE 2# 2x10 ea           Paloff Press             Sciatic nerve glide seated             Standard KISHORE: Foam   1x45\" EO/EC/HTs          Narrow KISHORE: Foam     1x45\" EO/EC/HTs         Balance Board   10x taps FWD/BKWD  [id number] fwd/bkwd                     Balance Assessment             Golf Swing Follow Through - Resisted    Green TB 2x10 ea dir          Chop   Blue TB 2x10 ea dir              " "         Ther Ex             Bent Knee Fallout             PPT             Open book 5x w/ 3\" hold ea dir 5\" hold x5 ea  10x ea dir 10x ea dir        LTR 10x w/ 3\" hold ea dir 10x 5\" hold           Seated Lumbar Flex w/ PB             Mid Row    Silver TB 3x10 Silver TB 3x10        Shld Ext/Lat Pulldown   Black TB 3x10  Silver TB 3x10        Shld Ext + March    Black TB 2x10 ea LE, alt Blk TB 2x10 ea LE alt        Shld Ext AAROM w/ Cane - w/ mild trunk rotation             LE Bike - for lower quarter mobility; endurance L2 10' L2 10' L2 10' L2 10' L2 10' L2 10'       Clamshell in s/l             Standing Heel Raises             Standing Hip Ext    4# 3x10 ea LE (Standing on Foam) 4# 3x10 ea LE (Standing on Foam)        Standing Hip ABD    4# 3x10 ea LE (Standing on Foam) 4# 3x10 ea LE (Standing on Foam)        Shld horizontal ABD             Standing PND D2 Shld Flex             Piriformis Stretch             Trunk Rotation - Golf swing stretch                                       Reverse clamshell in s/l                          Anatomy as it relates to pt's symptoms/presentation             Address pt's questions PRN KS   KS  KS       HEP KS  KS   KS       Update on pt's status/symptoms KS  KS KS  KS                    Ther Activity             FOTO Questionnaire      KS       MO: Update on pt's symptoms/status/function      KS       MO: Objective assessment      KS       STS transfers    3000g MB 2x10 3000g MB 2x10                     Gait Training                                                    Modalities                                              "

## 2024-10-30 ENCOUNTER — OFFICE VISIT (OUTPATIENT)
Dept: PHYSICAL THERAPY | Facility: CLINIC | Age: 79
End: 2024-10-30
Payer: MEDICARE

## 2024-10-30 DIAGNOSIS — G89.29 CHRONIC BILATERAL LOW BACK PAIN WITHOUT SCIATICA: Primary | ICD-10-CM

## 2024-10-30 DIAGNOSIS — M54.50 CHRONIC BILATERAL LOW BACK PAIN WITHOUT SCIATICA: Primary | ICD-10-CM

## 2024-10-30 DIAGNOSIS — I10 HYPERTENSION, UNSPECIFIED TYPE: ICD-10-CM

## 2024-10-30 DIAGNOSIS — G62.9 NEUROPATHY: ICD-10-CM

## 2024-10-30 PROCEDURE — 97110 THERAPEUTIC EXERCISES: CPT

## 2024-10-30 PROCEDURE — 97112 NEUROMUSCULAR REEDUCATION: CPT

## 2024-10-30 NOTE — PROGRESS NOTES
"Daily Note     Today's date: 10/30/2024  Patient name: Kevin Reid  : 1945  MRN: [id number]  Referring provider: Quirino Ott MD  Dx:   Encounter Diagnosis     ICD-10-CM    1. Chronic bilateral low back pain without sciatica  M54.50     G89.29       2. Neuropathy  G62.9       3. Hypertension, unspecified type  I10           Start Time: 1503  Stop Time: 1545  Total time in clinic (min): 42 minutes    Subjective: Pt report that he has received injections in his sacroiliac on Monday. \"They worked.\"      Objective: See treatment diary below      Assessment: The pt participated in a skilled physical therapy session that focused on manual intervention, neuromuscular re-education, and therapeutic exercise. The pt presented with a mild to moderate increase in LBP while performing Paloff press, which reflects impairments in neuromuscular control and strength of core/spine stabilizing muscles (e.g., erector spinae). He tolerated treatment well. The pt would benefit from continued PT to address impairments in order to return to his PLOF without limitations due to pain.       Plan: Continue per plan of care.      Precautions: Neuropathy; HTN; Lymes Disease; PMH includes LLE Total Hip Replacement, RLE/LLE total knee replacements --- 2019: Laminectomy C3; Laminectomy L3 and Fusion L4-L5       8/29 9/5 9/12 9/12 9/26 10/24 10/30      Manuals             STM/MFR KS (lumbar; posterior pelvis) RK  (lumbar; posterior pelvis) KS (lumbar; posterior pelvis) KS (lumbar; thoracic; posterior pelvis) RK lumbar glutes R>L KS (lumbar; posterior pelvis) KS (lumbar; posterior pelvis)      Lumbar PA KS (+ SIJ)  KS (+ SIJ) KS (+ SIJ)  KS (+ SIJ) KS (+ SIJ)                                Neuro Re-Ed             Sciatic Nerve Glide - Supine             TA Activation/Core brace 2x10 w/ 5\" hold 2x10 5\"           TA + Iso Hip ADD             TA + Bent Knee Fallout Blue TB 2x10 ea LE Blue TB 2x10 ea           Resisted Bridge Blue TB " "2x10 Blue TB 2x10    2x10       Hook-lying Core Brace + March 2# 2x10 ea LE, alt 2# 2x10 ea alt    2x10 ea LE, alt.       PPT + Core Brace w/ Physioball press - Hook-lying             Core Brace + SLR 2# 2x10 ea LE 2# 2x10 ea           Paloff Press       Blue TB 2x10 w/ 3\" hold ea dir      Sciatic nerve glide seated             Standard KISHORE: Foam   1x45\" EO/EC/HTs          Narrow KISHORE: Foam     1x45\" EO/EC/HTs   1x45\" EO/EC/HTs      Modified Tandem: Foam       2x45\" ea foot front       Tandem: Floor       2x45\" ea foot front      Balance Board   10x taps FWD/BKWD  [id number] fwd/bkwd                     Balance Assessment             Golf Swing Follow Through - Resisted    Green TB 2x10 ea dir          Chop   Blue TB 2x10 ea dir                       Ther Ex             Bent Knee Fallout             PPT             Open book 5x w/ 3\" hold ea dir 5\" hold x5 ea  10x ea dir 10x ea dir        LTR 10x w/ 3\" hold ea dir 10x 5\" hold           Seated Lumbar Flex w/ PB             Mid Row    Silver TB 3x10 Silver TB 3x10  Silver TB 3x10      Shld Ext/Lat Pulldown   Black TB 3x10  Silver TB 3x10  Black TB 3x10      Shld Ext + March    Black TB 2x10 ea LE, alt Blk TB 2x10 ea LE alt  Blue TB 30x ea LE      Shld Ext AAROM w/ Cane - w/ mild trunk rotation             LE Bike - for lower quarter mobility; endurance L2 10' L2 10' L2 10' L2 10' L2 10' L2 10' L2 10'      Clamshell in s/l             Standing Heel Raises             Standing Hip Ext    4# 3x10 ea LE (Standing on Foam) 4# 3x10 ea LE (Standing on Foam)        Standing Hip ABD    4# 3x10 ea LE (Standing on Foam) 4# 3x10 ea LE (Standing on Foam)        Shld horizontal ABD             Standing PND D2 Shld Flex             Piriformis Stretch             Trunk Rotation - Golf swing stretch                                       Reverse clamshell in s/l                          Anatomy as it relates to pt's symptoms/presentation             Address pt's questions PRN KS   KS  KS  "      HEP KS  KS   KS       Update on pt's status/symptoms KS  KS KS  KS KS                   Ther Activity             FOTO Questionnaire      KS       HI: Update on pt's symptoms/status/function      KS       HI: Objective assessment      KS       STS transfers    3000g MB 2x10 3000g MB 2x10                     Gait Training                                                    Modalities

## 2024-11-19 ENCOUNTER — APPOINTMENT (OUTPATIENT)
Dept: PHYSICAL THERAPY | Facility: CLINIC | Age: 79
End: 2024-11-19
Payer: MEDICARE

## 2024-11-25 NOTE — PROGRESS NOTES
Daily Note     Today's date: 2024  Patient name: Kevin Reid  : 1945  MRN: [id number]  Referring provider: Quirino Ott MD  Dx:   Encounter Diagnosis     ICD-10-CM    1. Chronic bilateral low back pain without sciatica  M54.50     G89.29       2. Neuropathy  G62.9       3. Hypertension, unspecified type  I10           Start Time: 08  Stop Time: 09  Total time in clinic (min): 43 minutes    Subjective: The pt recently returned from a trip to [place], [place], and [place]. He reports that his legs/thighs felt achy after driving for long periods of time. -- He felt an increase in back pain yesterday. He went for a four mile walk and felt better. -- Pt played 9 holes of golf this most recent trip. He started this game wearing a back brace. He took the back brace off after two holes. Pt denies any increase in back pain after taking off his brace.      Objective: See treatment diary below      Assessment: The pt participated in a skilled physical therapy session that focused on manual intervention, neuromuscular re-education, and therapeutic exercise. In response to a progressive onset of RLE knee pain (superior patella) while standing on foam performing hip flexion, the number of repetitions was modified. He presented with less palpation tenderness within the musculature of the posterior pelvis and along the SIJ, which reflects progress in symptoms severity. He tolerated treatment well. The pt would benefit from continued PT to address impairments in order to return to his PLOF without limitations due to pain.       Plan: Continue per plan of care.  Re-evaluation next visit.     Precautions: Neuropathy; HTN; Lymes Disease; PMH includes LLE Total Hip Replacement, RLE/LLE total knee replacements --- 2019: Laminectomy C3; Laminectomy L3 and Fusion L4-L5       8/29 9/5 9/12 9/12 9/26 10/24 10/30 11/26     Manuals             STM/MFR KS (lumbar; posterior pelvis) RK  (lumbar;  "posterior pelvis) KS (lumbar; posterior pelvis) KS (lumbar; thoracic; posterior pelvis) RK lumbar glutes R>L KS (lumbar; posterior pelvis) KS (lumbar; posterior pelvis) KS (lumbar; posterior pelvis)     Lumbar PA KS (+ SIJ)  KS (+ SIJ) KS (+ SIJ)  KS (+ SIJ) KS (+ SIJ) KS (+ SIJ)                               Neuro Re-Ed             Sciatic Nerve Glide - Supine             TA Activation/Core brace 2x10 w/ 5\" hold 2x10 5\"           TA + Iso Hip ADD             TA + Bent Knee Fallout Blue TB 2x10 ea LE Blue TB 2x10 ea           Resisted Bridge Blue TB 2x10 Blue TB 2x10    2x10       Hook-lying Core Brace + March 2# 2x10 ea LE, alt 2# 2x10 ea alt    2x10 ea LE, alt.       PPT + Core Brace w/ Physioball press - Hook-lying             Core Brace + SLR 2# 2x10 ea LE 2# 2x10 ea           Paloff Press       Blue TB 2x10 w/ 3\" hold ea dir      Sciatic nerve glide seated             Standard KISHORE: Foam   1x45\" EO/EC/HTs          Narrow KISHORE: Foam     1x45\" EO/EC/HTs   1x45\" EO/EC/HTs 1x45\" EO/EC/HTs     Modified Tandem: Foam       2x45\" ea foot front  2x45\" ea foot front      Tandem: Floor       2x45\" ea foot front      Balance Board   10x taps FWD/BKWD  [id number] fwd/bkwd                     Balance Assessment             Golf Swing Follow Through - Resisted    Green TB 2x10 ea dir          Chop   Blue TB 2x10 ea dir                       Ther Ex             Bent Knee Fallout             PPT             Open book 5x w/ 3\" hold ea dir 5\" hold x5 ea  10x ea dir 10x ea dir        LTR 10x w/ 3\" hold ea dir 10x 5\" hold           Seated Lumbar Flex w/ PB             Mid Row    Silver TB 3x10 Silver TB 3x10  Silver TB 3x10      Shld Ext/Lat Pulldown   Black TB 3x10  Silver TB 3x10  Black TB 3x10      Shld Ext + March    Black TB 2x10 ea LE, alt Blk TB 2x10 ea LE alt  Blue TB 30x ea LE      Shld Ext AAROM w/ Cane - w/ mild trunk rotation             LE Bike - for lower quarter mobility; endurance L2 10' L2 10' L2 10' L2 10' L2 10' L2 " 10' L2 10' L2  10'     Clamshell in s/l             Standing Heel Raises             Standing Hip Ext    4# 3x10 ea LE (Standing on Foam) 4# 3x10 ea LE (Standing on Foam)   4# 3x10 ea LE (Standing on Foam)     Standing Hip ABD    4# 3x10 ea LE (Standing on Foam) 4# 3x10 ea LE (Standing on Foam)   4# 3x10 ea LE (Standing on Foam)     Hip Flex: Standing on Foam        4# 2x10 ea LE     Shld horizontal ABD             Standing PND D2 Shld Flex             Piriformis Stretch             Trunk Rotation - Golf swing stretch                                       Reverse clamshell in s/l                          Anatomy as it relates to pt's symptoms/presentation             Address pt's questions PRN KS   KS  KS  KS     HEP KS  KS   KS       Update on pt's status/symptoms KS  KS KS  KS KS KS                  Ther Activity             FOTO Questionnaire      KS       CO: Update on pt's symptoms/status/function      KS       CO: Objective assessment      KS       STS transfers    3000g MB 2x10 3000g MB 2x10                     Gait Training                                                    Modalities

## 2024-11-26 ENCOUNTER — OFFICE VISIT (OUTPATIENT)
Dept: PHYSICAL THERAPY | Facility: CLINIC | Age: 79
End: 2024-11-26
Payer: MEDICARE

## 2024-11-26 DIAGNOSIS — G89.29 CHRONIC BILATERAL LOW BACK PAIN WITHOUT SCIATICA: Primary | ICD-10-CM

## 2024-11-26 DIAGNOSIS — M54.50 CHRONIC BILATERAL LOW BACK PAIN WITHOUT SCIATICA: Primary | ICD-10-CM

## 2024-11-26 DIAGNOSIS — G62.9 NEUROPATHY: ICD-10-CM

## 2024-11-26 DIAGNOSIS — I10 HYPERTENSION, UNSPECIFIED TYPE: ICD-10-CM

## 2024-11-26 PROCEDURE — 97140 MANUAL THERAPY 1/> REGIONS: CPT

## 2024-11-26 PROCEDURE — 97110 THERAPEUTIC EXERCISES: CPT

## 2024-11-26 PROCEDURE — 97112 NEUROMUSCULAR REEDUCATION: CPT

## 2024-12-16 NOTE — PROGRESS NOTES
PT Progress Report/Update POC    Today's date: 2024  Patient name: Kevin Reid  : 1945  MRN: [id number]  Referring provider: Quirino Ott MD  Dx:   Encounter Diagnosis     ICD-10-CM    1. Chronic bilateral low back pain without sciatica  M54.50     G89.29       2. Neuropathy  G62.9       3. Hypertension, unspecified type  I10             Start Time: 802  Stop Time: 845  Total time in clinic (min): 43 minutes    Assessment  Impairments: abnormal or restricted ROM, activity intolerance, impaired balance, impaired physical strength, pain with function, poor posture , poor body mechanics, participation limitations, activity limitations and endurance  Irritability comments: low to moderate    Assessment details: The pt is a 79 year old male who presents to skilled physical therapy services due to a decline in functional status related to an onset of acute on chronic LBP associated with lifting/carrying [place] tree in 2023. At this point in his POC, the pt has received 29 skilled physical therapy sessions. The pt's POC was on hold over the past 3 weeks because he was on vacation. His most recent appointment was on 2024. Upon completion of his NH, he presents with improvements in symptom frequency, lumbar mobility tolerance, and LE strength as noted by subjective report and objective measures. An 11 point increase in his FOTO Functional Status measure since the start of his POC and a 5 point increase since his previous NH reflect progress in functional mobility, activity capacity, as well as symptom management. He continues to present with impairments in pain, thoracolumbar mobility, flexibility, core/abdominal strength (diastasis recti - doming), static/dynamic balance (PMH neuropathy; balance assessment), and LE strength. As a result of these impairments, the pt has difficulty with the following functional activities: bending, lifting, carrying, and recreational activities  (e.g., golf, boat maintenance). POC will include manual therapy for flexibility/mobility and symptom modulation, modalities (PRN), TE/TA/NR interventions for functional strength and neuromuscular control, HEP, and pt education. The pt will continue to benefit from skilled physical therapy services to address impairments in order to return to his PLOF without limitations due to pain. Considering the chronicity of the pt's symptoms as well as his PMH (e.g., laminectomy/fusion lumbar spine), progress is slower and he will benefit from an extended POC when compared to a pt who is less medically complex. --- Pt also referred to physician for further assessment/examination of symptoms.    Thank you for the referral.    Barriers to therapy: Neuropathy; HTN; Lymes Disease; PMH includes LLE Total Hip Replacement, RLE/LLE total knee replacements --- 2019: Laminectomy C3; Laminectomy L3 and Fusion L4-L5  Barriers to intervention: poor previous attendance and medical complexity  Barriers to intervention comments: Attendance limited due to multiple extended vacations during POC  Understanding of Dx/Px/POC: good     Prognosis: good    Goals  STGs: Achieve within 5 weeks  1. Increase functional LE strength to at least 4+/5 for each major muscle group in order to reflect improved functional strength for ADLs/IADLs.   (ONGOING)  2. Decrease pain to no greater than 4/10 on the NPRS in order to reflect improved activity capacity/tolerance.    (ONGOING)  3. Independent with HEP to aid in continued progress between treatment sessions.   (MET)  4. Increase FOTO Functional Status measure by at least 5 points in order to reflect improvements in functional status and activity capacity.    (MET)    LTGs: Achieve by discharge  1. Decrease pain to no greater than 2/10 on the NPRS in order to reflect improved activity capacity/tolerance.   (ONGOING)  2. Progress to at least 90% return to PLOF per pt report in order to reflect improvements in  "functional status and activity capacity.       (PROGRESSING)  3. Increase FOTO Functional Status measure score to at least benchmark score.    (MET)  4. Independent with HEP for long-term management of symptoms and functional mobility.    (ONGOING)      Plan  Patient would benefit from: skilled physical therapy  Referral necessary: Yes  Planned modality interventions: thermotherapy: hydrocollator packs    Planned therapy interventions: abdominal trunk stabilization, joint mobilization, manual therapy, body mechanics training, neuromuscular re-education, patient education, postural training, flexibility, strengthening, stretching, functional ROM exercises, therapeutic activities, therapeutic exercise, home exercise program and balance    Frequency: 1x week  Plan of Care beginning date: 2/29/2024  Plan of Care expiration date: 1/17/2025  Treatment plan discussed with: patient  Plan details: 12/17/2024: 10/24/2024: Request extension of current POC at a frequency of 1x/wk to address remaining impairments -- POC was temporarily on hold while pt was away/on vacation for 3 weeks.     10/24/2024: Request extension of current POC at a frequency of 1x/wk to address remaining impairments -- POC was temporarily on hold while pt was away/on vacation for 4 weeks.     8/21/2024: Request 8 week extension to current POC at frequency of 1x/wk to address remaining impairments.    7/18/2024: Request extension of current POC at a frequency of 1x/wk to address remaining impairments -- Pt will be on vacation out of state during end of July/early August and will need temporary hold of POC. Plan to reassess pt status upon return from vacation.      Subjective Evaluation    History of Present Illness  Date of onset: 1/15/2024  Mechanism of injury:   PROGRESS REPORT: 12/17/2024  \"I can't tell you that it (back) stops me from doing anything.\" -- Pt reports that he feels that his neuropathy is getting worse. He notes numbness higher up his legs " "(makes reference to thighs). -- Pt was away on a trip to [place] this past month. He did not do many of his exercises, but was active with hiking. -- \"I've been doing a lot of activity around the house.\" \"I carried a live tree in the home.\" Pt notes that his wife helped him move the tree.    PROGRESS REPORT: 10/24/2024  Pt went for 2 long vacations over the past few weeks. He notes a progressive increase in leg pain (ache) while driving. He stopped once during 7-8 hour drives. Tingling/numbness in lower legs and feet when legs were sore. -- \"I try to avoid lifting anything too big.\" Items that are too big: dog food (33#), bird seed (40#), salt for water softener (40#), case of water. Pt explains that he will transfer these items to a cart. -- Scheduled for injections in the low back on Monday, 10/28/2024.     PROGRESS REPORT: 8/21/2024  The pt reports that his symptoms are \"about the same\" as last week. He notes central low back pain on both sides. He denies symptoms down his legs. -- Pt has a follow-up with the physician who performed injections to SIJ later this morning. -- Pt reports he is better able to reach back. -- Pt notes needing to be careful when lifting/moving heavy items (e.g., air conditioner). -- Pt reports decreased use of gabapentin over the past 2 weeks due to concerns about how the medication can effect balance. He notes no change in his neuropathy after dose change. He has not talked to his physician about the change. He plans to discuss this with his physician at his appointment today.    PROGRESS REPORT: 7/18/2024  Pt reports that his back is feeling \"better.\" He received injections at both SIJ on 7/8/2024. Pt notes that he notices it the most in the morning with a decrease in pain. -- Improved standing tolerance. Pt notes able to stand longer than 30 minutes without issue (e.g., shopping). -- Pt reports that it is easier to manage stairs and to get out of bed.      PROGRESS REPORT: " "6/13/2024  The pt reports a change and increase in his low back pain over the past two weeks. He explains that he has pain across his lower back on both sides with symptoms radiating to the sides of his hips/pelvis and into the L thigh. He is unable to recall a particular event/injury that would have caused the change in his symptoms. -- He notes that he plans to reach out to one of his prior physicians for an assessment and to discuss injections (previous treatment). -- Pt reports 4/10-5/10 pain upon arrival.     PROGRESS REPORT: 5/2/2024  \"I tweaked my back this weekend.\" Pt explains that he was polishing his boat. He notes significant pain in the right side of of his low back and an on onset of aching pain down the LLE. Pt notes that he was out working on the boat for about 3 hours. Pt has used heat, ice, and Tylenol to help mange pain. -- Pt has not played golf recently (personal goal). -- Pt reports that he is avoiding lifting heavier items with concerns that he may hurt his back.      PROGRESS REPORT: 3/28/2024  Pt reports low back pain on right side. Pt denies tingling/numbness down LE. -- Pt reports that he is going to [employer] every 3rd day (better than every other day to allow for rest). Pt has some pain with elliptical, but no pain with other exercises. -- Back pain does not wake him up at night.    INITIAL EVALUATION:  Pt reports symptoms of chronic LBP with a recent increase in pain over the past 4-6 weeks after carrying/lifting [place] tree out of the house. -- Pt reports a history of LBP (R side > L side).      Chronic LE knee pain (L > R) pain, particularly with going up stairs. Pt denies that the knee give out and notes that his knee feel stable.    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 (no medical plans); Multiple steroid injections for low back; Ablation (2023 - \"that did not do anything at all\")          Recurrent probem    Patient Goals  Patient goals for therapy: decreased pain, " increased motion, independence with ADLs/IADLs and return to sport/leisure activities  Patient goal: Return to playing golf  Pain  Current pain ratin  At best pain ratin  At worst pain ratin  Location: lower back - bilateral  Quality: dull ache  Relieving factors: relaxation, rest, change in position and medications (Cat-cow; HEP)  Aggravating factors: lifting (Lifting/carrying fire wood (avoiding due to concerns for pain); Bending/lifting heavy items (greater than 10 pounds))    Social Support  Steps to enter house: yes (Front: 1; Back: 13)  Stairs in house: yes (2 sets: 13 steps)   Lives in: multiple-level home  Lives with: spouse    Employment status: not working ()  Treatments  Previous treatment: injection treatment and medication  Current treatment: injection treatment, medication and physical therapy  Current treatment comments: Injection in left shoulder within past year; B/L SIJ 2024.       Objective     Concurrent Complaints  Negative for night pain, disturbed sleep, bladder dysfunction and bowel dysfunction    Static Posture     Head  Forward.    Shoulders  Depressed and rounded.    Thoracic Spine  Hyperkyphosis.    Lumbar Spine   Flattened and decreased lordosis.     Postural Observations  Seated posture: fair  Standing posture: fair      Palpation     Right   Tenderness of the erector spinae, lumbar interspinals, lumbar paraspinals and quadratus lumborum.     Tenderness     Lumbar Spine  No tenderness in the spinous process.     Right Hip   Tenderness in the PSIS and sacroiliac joint.     Neurological Testing     Sensation     Lumbar   Left   Intact: light touch  Diminished: light touch    Right   Intact: light touch    Comments   Left light touch: Diminished along medial lower leg    Active Range of Motion     Lumbar   Flexion:  with pain Restriction level: moderate  Extension:  with pain  Left lateral flexion: Active left lumbar lateral flexion: Right side  thoracolumbar pain.    Restriction level: moderate  Right lateral flexion:  with pain Restriction level: moderate  Left rotation: Active left lumbar rotation: 3/10 pain.  with pain Restriction level: moderate  Right rotation: Active right lumbar rotation: 3/10 pain.  with pain Restriction level: moderate    Joint Play     Hypomobile: L1, L2, L3, L4, L5 and S1     Strength/Myotome Testing     Left Hip   Planes of Motion   Flexion: 5    Right Hip   Planes of Motion   Flexion: 4+    Left Knee   Flexion: 5  Extension: 5    Right Knee   Flexion: 5  Extension: 5    Left Ankle/Foot   Dorsiflexion: 5    Right Ankle/Foot   Dorsiflexion: 5    Tests     Lumbar     Left   Negative slump test.     Right   Negative slump test.     Left Hip   Positive FADIR.   Negative NIKKI.     Right Hip   Negative NIKKI and FADIR.     Ambulation   Weight-Bearing Status   Weight-Bearing Status (Left): full weight bearing   Weight-Bearing Status (Right): full weight-bearing    Assistive device used: none    Quality of Movement During Gait     Additional Quality of Movement During Gait Details  Limited trunk rotation during ambulation    Functional Assessment        Comments  5x STS Test (10/24/2024): 12 sec (unsteady); 11 sec. (Improvement in steadiness)  -- Standard chair; no UE support    Balance Assessment: 8/21/2024  Modified Tandem (floor): 30+ sec w/ ea foot front  Tandem (floor): 32 sec (LLE front); 14 sec (RLE front)               Precautions: Neuropathy; HTN; Lymes Disease; PMH includes LLE Total Hip Replacement, RLE/LLE total knee replacements --- 2019: Laminectomy C3; Laminectomy L3 and Fusion L4-L5       8/29 9/5 9/12 9/12 9/26 10/24 10/30 11/26 12/17    Manuals             STM/MFR KS (lumbar; posterior pelvis) RK  (lumbar; posterior pelvis) KS (lumbar; posterior pelvis) KS (lumbar; thoracic; posterior pelvis) RK lumbar glutes R>L KS (lumbar; posterior pelvis) KS (lumbar; posterior pelvis) KS (lumbar; posterior pelvis) KS (lumbar;  "posterior pelvis)    Lumbar PA KS (+ SIJ)  KS (+ SIJ) KS (+ SIJ)  KS (+ SIJ) KS (+ SIJ) KS (+ SIJ) KS (+ SIJ)                              Neuro Re-Ed             Sciatic Nerve Glide - Supine             TA Activation/Core brace 2x10 w/ 5\" hold 2x10 5\"           TA + Iso Hip ADD             TA + Bent Knee Fallout Blue TB 2x10 ea LE Blue TB 2x10 ea           Resisted Bridge Blue TB 2x10 Blue TB 2x10    2x10       Hook-lying Core Brace + March 2# 2x10 ea LE, alt 2# 2x10 ea alt    2x10 ea LE, alt.       PPT + Core Brace w/ Physioball press - Hook-lying             Core Brace + SLR 2# 2x10 ea LE 2# 2x10 ea       2# 2x10 ea LE    Paloff Press       Blue TB 2x10 w/ 3\" hold ea dir      Sciatic nerve glide seated             Standard KISHORE: Foam   1x45\" EO/EC/HTs          Narrow KISHORE: Foam     1x45\" EO/EC/HTs   1x45\" EO/EC/HTs 1x45\" EO/EC/HTs     Modified Tandem: Foam       2x45\" ea foot front  2x45\" ea foot front      Tandem: Floor       2x45\" ea foot front      Balance Board   10x taps FWD/BKWD  [id number] fwd/bkwd                     Balance Assessment             Golf Swing Follow Through - Resisted    Green TB 2x10 ea dir          Chop   Blue TB 2x10 ea dir                       Ther Ex             Bent Knee Fallout             PPT             Open book 5x w/ 3\" hold ea dir 5\" hold x5 ea  10x ea dir 10x ea dir        LTR 10x w/ 3\" hold ea dir 10x 5\" hold           Seated Lumbar Flex w/ PB             Mid Row    Silver TB 3x10 Silver TB 3x10  Silver TB 3x10      Shld Ext/Lat Pulldown   Black TB 3x10  Silver TB 3x10  Black TB 3x10      Shld Ext + March    Black TB 2x10 ea LE, alt Blk TB 2x10 ea LE alt  Blue TB 30x ea LE      Shld Ext AAROM w/ Cane - w/ mild trunk rotation             LE Bike - for lower quarter mobility; endurance L2 10' L2 10' L2 10' L2 10' L2 10' L2 10' L2 10' L2  10' L2 10'    Clamshell in s/l             Standing Heel Raises             Standing Hip Ext    4# 3x10 ea LE (Standing on Foam) 4# 3x10 ea LE " (Standing on Foam)   4# 3x10 ea LE (Standing on Foam)     Standing Hip ABD    4# 3x10 ea LE (Standing on Foam) 4# 3x10 ea LE (Standing on Foam)   4# 3x10 ea LE (Standing on Foam)     Hip Flex: Standing on Foam        4# 2x10 ea LE     Shld horizontal ABD             Standing PND D2 Shld Flex             Piriformis Stretch             Trunk Rotation - Golf swing stretch                                       Reverse clamshell in s/l                          Anatomy as it relates to pt's symptoms/presentation             Address pt's questions PRN KS   KS  KS  KS KS    HEP KS  KS   KS   KS    Update on pt's status/symptoms KS  KS KS  KS KS KS KS    NC: Objective assessment         KS                 Ther Activity             FOTO Questionnaire      KS   KS    NC: Update on pt's symptoms/status/function      KS   KS    NC: Objective assessment      KS       STS transfers    3000g MB 2x10 3000g MB 2x10                     Gait Training                                                    Modalities

## 2024-12-17 ENCOUNTER — EVALUATION (OUTPATIENT)
Dept: PHYSICAL THERAPY | Facility: CLINIC | Age: 79
End: 2024-12-17
Payer: MEDICARE

## 2024-12-17 DIAGNOSIS — G62.9 NEUROPATHY: ICD-10-CM

## 2024-12-17 DIAGNOSIS — G89.29 CHRONIC BILATERAL LOW BACK PAIN WITHOUT SCIATICA: Primary | ICD-10-CM

## 2024-12-17 DIAGNOSIS — M54.50 CHRONIC BILATERAL LOW BACK PAIN WITHOUT SCIATICA: Primary | ICD-10-CM

## 2024-12-17 DIAGNOSIS — I10 HYPERTENSION, UNSPECIFIED TYPE: ICD-10-CM

## 2024-12-17 PROCEDURE — 97530 THERAPEUTIC ACTIVITIES: CPT

## 2024-12-17 PROCEDURE — 97110 THERAPEUTIC EXERCISES: CPT

## 2024-12-22 NOTE — PROGRESS NOTES
"PT Discharge    Today's date: 2024  Patient name: Kevin Reid  : 1945  MRN: [id number]  Referring provider: Quirino Ott MD  Dx:   Encounter Diagnosis     ICD-10-CM    1. Chronic bilateral low back pain without sciatica  M54.50     G89.29       2. Hypertension, unspecified type  I10       3. Neuropathy  G62.9           Start Time: 08  Stop Time: 0900  Total time in clinic (min): 41 minutes      Subjective: The pt reports that his back feels \"achy\" this morning. He notes that he has not scheduled a follow-up appointment with his physician at this time, \"but I will.\" -- \"I can recover more quickly.\" The back pain \"is not as intense.\" -- Pt confirms that he feels ready for discharge. He denies need for a new print out of home exercises.      Objective: See treatment diary below      Assessment: The pt's program focused on manual intervention and therapeutic exercise. Interventions focused on improving strength and activity capacity of postural and lumbopelvic musculature necessary for spinal support/stability and capacity necessary for ADLs/IADLs. He tolerated treatment well. Considering the pt's progress over the course of his POC and his confidence with going forward independently, the pt will be discharged from his current POC and transitioned to an independent [facility] for long-term management of his symptoms and functional mobility. He was educated to contact the clinic with any follow-up questions and/or changes in functional status. The pt verbalized good understanding.      Plan: Discharge from current POC.      Precautions: Neuropathy; HTN; Lymes Disease; PMH includes LLE Total Hip Replacement, RLE/LLE total knee replacements --- 2019: Laminectomy C3; Laminectomy L3 and Fusion L4-L5       8/29 9/5 9/12 9/12 9/26   10/24 10/30 11/26 12/17 12/23   Manuals             STM/MFR KS (lumbar; posterior pelvis) RK  (lumbar; posterior pelvis) KS (lumbar; posterior pelvis) KS (lumbar; thoracic; " "posterior pelvis) RK lumbar glutes R>L KS (lumbar; posterior pelvis) KS (lumbar; posterior pelvis) KS (lumbar; posterior pelvis) KS (lumbar; posterior pelvis) KS (lumbar; posterior pelvis)   Lumbar PA KS (+ SIJ)  KS (+ SIJ) KS (+ SIJ)  KS (+ SIJ) KS (+ SIJ) KS (+ SIJ) KS (+ SIJ) KS (+ SIJ)                             Neuro Re-Ed             Sciatic Nerve Glide - Supine             TA Activation/Core brace 2x10 w/ 5\" hold 2x10 5\"           TA + Iso Hip ADD             TA + Bent Knee Fallout Blue TB 2x10 ea LE Blue TB 2x10 ea           Resisted Bridge Blue TB 2x10 Blue TB 2x10    2x10       Hook-lying Core Brace + March 2# 2x10 ea LE, alt 2# 2x10 ea alt    2x10 ea LE, alt.       PPT + Core Brace w/ Physioball press - Hook-lying             Core Brace + SLR 2# 2x10 ea LE 2# 2x10 ea       2# 2x10 ea LE    Paloff Press       Blue TB 2x10 w/ 3\" hold ea dir      Sciatic nerve glide seated             Standard KISHORE: Foam   1x45\" EO/EC/HTs          Narrow KISHORE: Foam     1x45\" EO/EC/HTs   1x45\" EO/EC/HTs 1x45\" EO/EC/HTs     Modified Tandem: Foam       2x45\" ea foot front  2x45\" ea foot front      Tandem: Floor       2x45\" ea foot front      Balance Board   10x taps FWD/BKWD  [id number] fwd/bkwd                     Balance Assessment             Golf Swing Follow Through - Resisted    Green TB 2x10 ea dir          Chop   Blue TB 2x10 ea dir                       Ther Ex             Bent Knee Fallout             PPT             Open book 5x w/ 3\" hold ea dir 5\" hold x5 ea  10x ea dir 10x ea dir        LTR 10x w/ 3\" hold ea dir 10x 5\" hold           Seated Lumbar Flex w/ PB             Mid Row    Silver TB 3x10 Silver TB 3x10  Silver TB 3x10   Silver TB 3x10   Shld Ext/Lat Pulldown   Black TB 3x10  Silver TB 3x10  Black TB 3x10   Black TB 3x10   Shld Ext + March    Black TB 2x10 ea LE, alt Blk TB 2x10 ea LE alt  Blue TB 30x ea LE      Shld Ext AAROM w/ Cane - w/ mild trunk rotation             LE Bike - for lower quarter mobility; " endurance L2 10' L2 10' L2 10' L2 10' L2 10' L2 10' L2 10' L2  10' L2 10' L2 10'   Clamshell in s/l             Standing Heel Raises             Standing Hip Ext    4# 3x10 ea LE (Standing on Foam) 4# 3x10 ea LE (Standing on Foam)   4# 3x10 ea LE (Standing on Foam)  4# 3x10 ea LE (Standing on Foam)   Standing Hip ABD    4# 3x10 ea LE (Standing on Foam) 4# 3x10 ea LE (Standing on Foam)   4# 3x10 ea LE (Standing on Foam)  4# 3x10 ea LE (Standing on Foam)   Hip Flex: Standing on Foam        4# 2x10 ea LE  4# 3x10 ea LE   Shld horizontal ABD             Standing PND D2 Shld Flex             Piriformis Stretch             Trunk Rotation - Golf swing stretch                                       Reverse clamshell in s/l                          Anatomy as it relates to pt's symptoms/presentation             Address pt's questions PRN KS   KS  KS  KS KS    HEP KS  KS   KS   KS KS   Update on pt's status/symptoms KS  KS KS  KS KS KS KS KS   AR: Objective assessment         KS                 Ther Activity             FOTO Questionnaire      KS   KS    AR: Update on pt's symptoms/status/function      KS   KS    AR: Objective assessment      KS       STS transfers    3000g MB 2x10 3000g MB 2x10                     Gait Training                                                    Modalities

## 2024-12-23 ENCOUNTER — OFFICE VISIT (OUTPATIENT)
Dept: PHYSICAL THERAPY | Facility: CLINIC | Age: 79
End: 2024-12-23
Payer: MEDICARE

## 2024-12-23 DIAGNOSIS — I10 HYPERTENSION, UNSPECIFIED TYPE: ICD-10-CM

## 2024-12-23 DIAGNOSIS — M54.50 CHRONIC BILATERAL LOW BACK PAIN WITHOUT SCIATICA: Primary | ICD-10-CM

## 2024-12-23 DIAGNOSIS — G62.9 NEUROPATHY: ICD-10-CM

## 2024-12-23 DIAGNOSIS — G89.29 CHRONIC BILATERAL LOW BACK PAIN WITHOUT SCIATICA: Primary | ICD-10-CM

## 2024-12-23 PROCEDURE — 97140 MANUAL THERAPY 1/> REGIONS: CPT

## 2024-12-23 PROCEDURE — 97110 THERAPEUTIC EXERCISES: CPT

## 2024-12-31 ENCOUNTER — TELEPHONE (OUTPATIENT)
Age: 79
End: 2024-12-31

## 2024-12-31 ENCOUNTER — APPOINTMENT (OUTPATIENT)
Dept: PHYSICAL THERAPY | Facility: CLINIC | Age: 79
End: 2024-12-31
Payer: MEDICARE

## 2024-12-31 NOTE — TELEPHONE ENCOUNTER
Pt calling inf or f/u appt. Pt states he would like to asses where he is at now since last seeing Dr Ott. Pt asked to be schedule din feb 2025. No notes in Dr Ott last Office visit note.      FYI     Dr Ott, is 30 min slot okay ? Since pt hasn't been seen in awhile.

## 2025-02-23 NOTE — PROGRESS NOTES
"Name: Kevin Reid      : 1945      MRN: [id number]  Encounter Provider: Quirino Ott MD  Encounter Date: 2025   Encounter department: [facility]  :  Assessment & Plan  Neuropathy  I believe that Mr. Reid has neuropathy even though his electrical study was more in keeping with radiculopathy.  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  Regardless, he is relatively stable from a neurologic standpoint.  No other measures appear to be required at this time.  This would be reconsidered based on his clinical course.  I am happy to see him annually or sooner if needed.  All of his questions were answered and he is in agreement with this plan.               History of Present Illness   HPI     I had the pleasure of seeing Kevin Reid, a 79 y.o. male, in follow-up.  Please be aware of the inherent limitations of voice recognition software, which may result in transcriptional errors.    In 2018 he had right knee replacement.  Within 2 months he developed severe left \"unbearable\" pain \"in my rib cage traveling around to the back\".  He lost 20 pounds in 3 weeks and could not sleep.   He had \"a patch on my back and it was pale around to the front\" on his skin; the diagnosis of shingles was considered, and he was treated with antiviral medications.  Laboratory testing for Lyme disease was \"marginally positive\" and he was treated with antibiotics.  An ID specialist told him he had \"Lyme disease at some point\".  After a few months the pain resolved.  In retrospect, even before the surgery he had been experiencing \"numbness\" in the right foot but with further questioning it was \"crampy, did not feel right\" without sensory loss.  After the pain resolved he noticed that he could not feel his feet bilaterally to touch, mostly in the toes and the " "heels.  It now feels \"like I have socks on even though I do not\" .  This has gradually climbed to the knees, sometimes higher.  Sometimes he perceives diminished sensation in his fingertips.      He had a series of MRI studies of the entire spine.  He apparently had moderate to severe lumbar stenosis, and he had decompressive surgery in 2018.  He had a cervical laminectomy in 2019 as well, but these have not led to a change in his neurologic symptoms.     He was found to have enlarged lymph nodes in his mediastinum leading to mediastinoscopy and the diagnosis of \"sarcoidosis\" in 1973.  He was treated with prednisone, and this has not returned.  He has \"borderline fasting sugars\" but his hemoglobin A1c has ranged from 5.1-5.6, but is now 6.2.  Laboratory testing was otherwise unremarkable.  He had an EMG which was in keeping with a chronic left L5-S1 radiculopathy and right L4-S1 radiculopathy but without underlying evidence of neuropathy.     He has participated in physical therapy which he thinks has made him more flexible.  However, his sensation of numbness has traveled just above the knees.  It was thought that he had sacroiliac joint dysfunction; a first injection seemed helpful but the second did not.    No visits with results within 6 Month(s) from this visit.   Latest known visit with results is:   Appointment on 01/11/2024   Component Date Value Ref Range Status    Hemoglobin A1C 01/11/2024 6.2 (H)  Normal 4.0-5.6%; PreDiabetic 5.7-6.4%; Diabetic >=6.5%; Glycemic control for adults with diabetes <7.0% % Final    EAG 01/11/2024 131  mg/dl Final    Vitamin B-12 01/11/2024 640  180 - 914 pg/mL Final    Methylmalonic Acid, S 01/11/2024 135  0 - 378 nmol/L Final    A/G Ratio 01/11/2024 1.42  1.10 - 1.80 Final    Albumin Electrophoresis 01/11/2024 58.7  48.0 - 70.0 % Final    Albumin CONC 01/11/2024 3.70  3.20 - 5.10 g/dl Final    Alpha 1 01/11/2024 4.5  1.8 - 7.0 % Final    ALPHA 1 CONC 01/11/2024 0.28  0.15 - " 0.47 g/dL Final    Alpha 2 01/11/2024 11.7  5.9 - 14.9 % Final    ALPHA 2 CONC 01/11/2024 0.74  0.42 - 1.04 g/dL Final    Beta-1 01/11/2024 6.4  4.7 - 7.7 % Final    BETA 1 CONC 01/11/2024 0.40  0.31 - 0.57 g/dL Final    Beta-2 01/11/2024 6.9  3.1 - 7.9 % Final    BETA 2 CONC 01/11/2024 0.43  0.20 - 0.58 g/dL Final    Gamma Globulin 01/11/2024 11.8  6.9 - 22.3 % Final    GAMMA CONC 01/11/2024 0.74  0.40 - 1.66 g/dL Final    Total Protein 01/11/2024 6.3 (L)  6.4 - 8.2 g/dL Final    SPEP Interpretation 01/11/2024 See Comment   Final    No monoclonal bands noted. Reviewed by: Naren Nj MD **Electronic Signature**      .        Review of Systems I have personally reviewed the MA's review of systems and made changes as necessary.    Medical History Reviewed by provider this encounter:     .  Past Medical History   Past Medical History:   Diagnosis Date    Hypertension      Past Surgical History:   Procedure Laterality Date    REPLACEMENT TOTAL KNEE Right     REPLACEMENT TOTAL KNEE Left     TOTAL HIP ARTHROPLASTY Left      No family history on file.   reports that he has never smoked. He has never used smokeless tobacco.  Current Outpatient Medications   Medication Instructions    DULoxetine (CYMBALTA) 120 mg, Daily    gabapentin (NEURONTIN) 600 mg, 5 times daily    ibuprofen (MOTRIN) 400 mg    losartan-hydrochlorothiazide (HYZAAR) 50-12.5 mg per tablet 1 tablet, Daily    pantoprazole (PROTONIX) 40 mg, Daily    simvastatin (ZOCOR) 20 mg, Daily    tamsulosin (FLOMAX) 0.4 mg     zolpidem (AMBIEN) 5 mg   No Known Allergies   Current Outpatient Medications on File Prior to Visit   Medication Sig Dispense Refill    DULoxetine (CYMBALTA) 60 mg delayed release capsule Take 120 mg by mouth daily      gabapentin (NEURONTIN) 600 MG tablet Take 600 mg by mouth 5 (five) times a day      ibuprofen (MOTRIN) 400 mg tablet Take 400 mg by mouth      losartan-hydrochlorothiazide (HYZAAR) 50-12.5 mg per tablet Take 1 tablet  by mouth daily      pantoprazole (PROTONIX) 20 mg tablet Take 40 mg by mouth daily      simvastatin (ZOCOR) 20 mg tablet Take 20 mg by mouth daily      tamsulosin (FLOMAX) 0.4 mg       zolpidem (AMBIEN) 5 mg tablet Take 5 mg by mouth       No current facility-administered medications on file prior to visit.      Social History     Tobacco Use    Smoking status: Never    Smokeless tobacco: Never   Substance and Sexual Activity    Alcohol use: Not on file    Drug use: Not on file    Sexual activity: Not on file     Review of Systems   Constitutional:  Negative for appetite change, fatigue and fever.   HENT: Negative.  Negative for hearing loss, tinnitus, trouble swallowing and voice change.    Eyes: Negative.  Negative for photophobia, pain and visual disturbance.   Respiratory: Negative.  Negative for shortness of breath.    Cardiovascular: Negative.  Negative for palpitations.   Gastrointestinal: Negative.  Negative for nausea and vomiting.   Endocrine: Negative.  Negative for cold intolerance.   Genitourinary: Negative.  Negative for dysuria, frequency and urgency.   Musculoskeletal:  Positive for gait problem (balance issues-feels like PT did not help much). Negative for back pain, myalgias, neck pain and neck stiffness.   Skin: Negative.  Negative for rash.   Allergic/Immunologic: Negative.    Neurological:  Positive for numbness (b/l leg, L worse-moving up to knees now). Negative for dizziness, tremors, seizures, syncope, facial asymmetry, speech difficulty, weakness, light-headedness and headaches.   Hematological: Negative.  Does not bruise/bleed easily.   Psychiatric/Behavioral: Negative.  Negative for confusion, hallucinations and sleep disturbance.       Objective   /78 (BP Location: Right arm, Patient Position: Sitting, Cuff Size: Adult)   Pulse 90   Temp 97.5 °F (36.4 °C) (Temporal)   Wt 110 kg (243 lb)   SpO2 97%   BMI 35.88 kg/m²     Physical Exam  Neurological Exam    Speech and cognition  appeared normal     Cranial nerves:  II: Pupils equal, round, and reactive to light. No gross visual field defect. I did not appreciate optic disc edema.  III, IV, VI: Extraocular movements intact  V: Normal facial sensation in all three divisions of the trigeminal nerve bilaterally  VII: Normal facial strength  VIII: Hearing intact to finger rub bilaterally  IX, X: Palate elevated symmetrically  XI: Sternocleidomastoid strength normal bilaterally  XII: Tongue protruded in midline without atrophy or fibrillations     Motor:  Normal tone and bulk throughout.   Muscle strength testing by the MRC scale was 5/5 in the deltoid, biceps, triceps, wrist extensors, wrist flexors, finger extensors, finger flexors,. Hip flexors, quadriceps, ankle dorsiflexors, ankle plantar flexors, and EHL bilaterally     Deep tendon reflexes:   2+ and symmetrical in the biceps, triceps, brachioradialis, and patellas; 2+ in the left ankle, 1+ on the right  Toes downgoing (no Babinski sign)  No Menendez's sign     Cerebellar: normal finger to nose and heel to shin testing     Gait: Normal

## 2025-02-24 ENCOUNTER — OFFICE VISIT (OUTPATIENT)
Dept: NEUROLOGY | Facility: CLINIC | Age: 80
End: 2025-02-24
Payer: MEDICARE

## 2025-02-24 VITALS
TEMPERATURE: 97.5 F | SYSTOLIC BLOOD PRESSURE: 122 MMHG | WEIGHT: 243 LBS | DIASTOLIC BLOOD PRESSURE: 78 MMHG | HEART RATE: 90 BPM | BODY MASS INDEX: 35.88 KG/M2 | OXYGEN SATURATION: 97 %

## 2025-02-24 DIAGNOSIS — G62.9 NEUROPATHY: Primary | ICD-10-CM

## 2025-02-24 PROCEDURE — 99213 OFFICE O/P EST LOW 20 MIN: CPT | Performed by: PSYCHIATRY & NEUROLOGY

## 2025-02-24 NOTE — ASSESSMENT & PLAN NOTE
I believe that Mr. Reid has neuropathy even though his electrical study was more in keeping with radiculopathy.  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  Regardless, he is relatively stable from a neurologic standpoint.  No other measures appear to be required at this time.  This would be reconsidered based on his clinical course.  I am happy to see him annually or sooner if needed.  All of his questions were answered and he is in agreement with this plan.

## 2025-02-24 NOTE — PROGRESS NOTES
Patient ID: Kevin Reid is a 79 y.o. male.    Assessment/Plan:    No problem-specific Assessment & Plan notes found for this encounter.       {Assess/PlanSmartLinks:15191}       Subjective:      HPI    {[facility] HPI texts:51483}    {Common ambulatory SmartLinks:09950}      ROS:    Review of Systems   Constitutional:  Negative for appetite change, fatigue and fever.   HENT: Negative.  Negative for hearing loss, tinnitus, trouble swallowing and voice change.    Eyes: Negative.  Negative for photophobia, pain and visual disturbance.   Respiratory: Negative.  Negative for shortness of breath.    Cardiovascular: Negative.  Negative for palpitations.   Gastrointestinal: Negative.  Negative for nausea and vomiting.   Endocrine: Negative.  Negative for cold intolerance.   Genitourinary: Negative.  Negative for dysuria, frequency and urgency.   Musculoskeletal:  Positive for gait problem (balance issues-feels like PT did not help much). Negative for back pain, myalgias, neck pain and neck stiffness.   Skin: Negative.  Negative for rash.   Allergic/Immunologic: Negative.    Neurological:  Positive for numbness (b/l leg, L worse-moving up to knees now). Negative for dizziness, tremors, seizures, syncope, facial asymmetry, speech difficulty, weakness, light-headedness and headaches.   Hematological: Negative.  Does not bruise/bleed easily.   Psychiatric/Behavioral: Negative.  Negative for confusion, hallucinations and sleep disturbance.           Objective:    There were no vitals taken for this visit.  Physical Exam    Neurological Exam